# Patient Record
Sex: FEMALE | Race: WHITE | NOT HISPANIC OR LATINO | Employment: STUDENT | URBAN - NONMETROPOLITAN AREA
[De-identification: names, ages, dates, MRNs, and addresses within clinical notes are randomized per-mention and may not be internally consistent; named-entity substitution may affect disease eponyms.]

---

## 2017-01-13 ENCOUNTER — COMMUNICATION - GICH (OUTPATIENT)
Dept: FAMILY MEDICINE | Facility: OTHER | Age: 22
End: 2017-01-13

## 2017-01-13 DIAGNOSIS — Z30.49 ENCOUNTER FOR SURVEILLANCE OF OTHER CONTRACEPTIVES: ICD-10-CM

## 2017-08-21 ENCOUNTER — HISTORY (OUTPATIENT)
Dept: FAMILY MEDICINE | Facility: OTHER | Age: 22
End: 2017-08-21

## 2017-08-21 ENCOUNTER — OFFICE VISIT - GICH (OUTPATIENT)
Dept: FAMILY MEDICINE | Facility: OTHER | Age: 22
End: 2017-08-21

## 2017-08-21 DIAGNOSIS — Z01.419 ENCOUNTER FOR GYNECOLOGICAL EXAMINATION WITHOUT ABNORMAL FINDING: ICD-10-CM

## 2017-08-21 DIAGNOSIS — Z00.00 ENCOUNTER FOR GENERAL ADULT MEDICAL EXAMINATION WITHOUT ABNORMAL FINDINGS: ICD-10-CM

## 2017-08-21 DIAGNOSIS — Z11.3 ENCOUNTER FOR SCREENING FOR INFECTIONS WITH PREDOMINANTLY SEXUAL MODE OF TRANSMISSION: ICD-10-CM

## 2017-08-21 DIAGNOSIS — Z30.49 ENCOUNTER FOR SURVEILLANCE OF OTHER CONTRACEPTIVES: ICD-10-CM

## 2017-12-28 NOTE — PATIENT INSTRUCTIONS
"Patient Information     Patient Name MRN Matilde Graham [id number] Female 1995      Patient Instructions by Jasmine Chavez PA-C at 2017  8:15 AM     Author:  Jasmine Chavez PA-C Service:  (none) Author Type:  PHYS- Physician Assistant     Filed:  2017  8:42 AM Encounter Date:  2017 Status:  Signed     :  Jasmine Cahvez PA-C (PHYS- Physician Assistant)            Healthy Strategies  1. Eat at least 3 meals a day and never skip breakfast.  2. Eat more slowly.  3. Decrease portion size.  4. Provide structure by using meal replacement bars or shakes, and/or low calorie frozen meals.  5. For good nutrition incorporate fruit, vegetables, whole grains, lean protein, and low-fat dairy.  6. Remove trigger foods from your environment to avoid impulse eating.  7. Increase physical activity: get a pedometer and aim for 10,000 steps a day or 30-35 minutes of activity 5 days per week.  8. Weigh yourself daily or at least weekly.  9. Keep a record of what you eat and your activity.  10. Establish a support system such as a friend, group or program.  11. Read Nabeel Wilkerson's \"Eat to Live\". Remember it is important to have a minimum of 1200 calories a day, okay to use olive oil, 40 grams of fiber daily. No more than two servings ( the size of your palm) of red meat a week.     Please consider the following general health recommendations:    Eat a quality diet (generally, low in simple sugars, starches, cholesterol and saturated fat.)    Please get 1500 mg of calcium in divided doses with 1500 units vitamin D in your diet daily.     Stay physically active. Regular walking or other exercise is one of the best ways to minimize pain of arthritis; maintain independence and mobility; maintain bone strength; maintain conditioning of your heart. Find something you enjoy and a friend to do it with you.    Maintain ideal weight. Your Body mass index is Body mass index is 28.64 kg/(m^2).. " Generally a BMI of 20-25 is considered ideal. Overweight is defined as 25-30, Obese is 30-35 and markedly obese is greater than 35.    Apply sun block (SPF 25 or greater) on exposed skin anytime you are out in the sun to prevent skin cancer.     Wear a seatbelt whenever you are in a car.    Schedule a mammogram annually starting at the age of 40 years old unless recommended earlier by your primary care provider. Come for a general exam once yearly.    Colonoscopy (an exam of the colon) is recommended every 10 years after the age of 50 to screen for colon cancer. (More often if you are at increased risk.)    You should have a tetanus booster at least once every 10 years.    Obtain a flu shot every fall.    Check blood sugar annually. Cholesterol annually unless you have had a normal level when last checked within 5 years.     I recommend that you have a general physical exam every year. You should have a pap every 3-5 years between the ages of 30 and 70 depending on your test unless you have had previous abnormal pap smear, (in these cases the exams and PAP's should be done yearly). If you have had hysterectomy in the past, your future Pap plan may be different.

## 2017-12-28 NOTE — PROGRESS NOTES
Patient Information     Patient Name MRN Sex Matilde Barnes [id number] Female 1995      Progress Notes by Jasmine Chavez PA-C at 2017  8:15 AM     Author:  Jasmine Chavez PA-C Service:  (none) Author Type:  PHYS- Physician Assistant     Filed:  2017 12:48 PM Encounter Date:  2017 Status:  Signed     :  Jasmine Chavez PA-C (PHYS- Physician Assistant)            Nursing Notes:   Nuria Sadler  2017  8:30 AM  Signed  Patient is here today for a yearly physical  Nuria Sadler LPN 2017 8:14 AM      ANNUAL PHYSICAL - FEMALE    HPI: Matilde Mancilla is a 22 y.o. female who presents for a yearly exam.  Concerns include: none    Patient's last menstrual period was 2017.   Contraception: on OCP  Risk for STI?: no  Last pap: none  Any hx of abnormal paps:  no  FH of early CA?: paternal side has hx colon cancer  Cholesterol/DM concerns/screening: no  Tobacco?: no  Calcium intake: some  DEXA: na  Last mammo: na  Colonoscopy: na  Immunizations: last tetanus in 2017, hep a #1    Patient Active Problem List      Diagnosis Date Noted     Contraception management 2014     ACNE VULGARIS 06/15/2011     ASTHMA, EXERCISE INDUCED        Past Medical History:     Diagnosis  Date     Acne vulgaris      Exercise-induced asthma     improved with time        Past Surgical History:      Procedure  Laterality Date     NO PREVIOUS SURGERY         Social History     Social History        Marital status:  Single     Spouse name: N/A     Number of children:  N/A     Years of education:  N/A     Occupational History      Not on file.     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     One older brother.        Virgilio             Father.     Leah Mother.      Nikolay Brother.                 Family History       Problem   Relation Age of Onset     Asthma  Mother      Allergies  Mother      Good Health  Father      Does not go to doctor regularly       Other  Maternal Grandmother      aneurysm         Current Outpatient Prescriptions       Medication  Sig Dispense Refill     benzoyl peroxide 5% 5 % gel Apply  topically to affected area(s) every morning. 42.5 g 5     norgestimate-ethinyl estradiol (TRI-SPRINTEC) 0.18/0.215/0.25 mg-35 mcg (28) tablet Take 1 tablet by mouth once daily. 84 tablet 2     No current facility-administered medications for this visit.      Medications have been reviewed by me and are current to the best of my knowledge and ability.       REVIEW OF SYSTEMS:  Refer to HPI.    PHYSICAL EXAM:  /70  Pulse 64  Ht 1.64 m (5' 4.57\")  Wt 77 kg (169 lb 12.8 oz)  LMP 08/02/2017  BMI 28.64 kg/m2  CONSTITUTIONAL:  Alert, cooperative, NAD.  EYES: No scleral icterus.  PERRLA.  Conjunctiva clear.  ENT/MOUTH: External ears and nose normal.  TMs normal.  Moist mucous membranes. Oropharynx clear.    ENDO: No thyromegaly or thyroid nodules.  LYMPH:  No cervical or supraclavicular LA.    BREASTS: No skin abnormalities, no erythema.  No discrete masses.  No nipple discharge, no axillary, supra- or infraclavicular LA.   CARDIOVASCULAR: Regular, S1, S2.  No S3 or S4.  No murmur/gallop/rub.  No peripheral edema.  RESPIRATORY: CTA bilaterally, no wheezes, rhonchi or rales.  GI: Bowel sounds wnl.  Soft, nontender, nondistended.  No masses or HSM.  No rebound or guarding.  : Vulva: normal, no lesions or discharge  Urethral meatus: normal size and location, no lesions or discharge  Urethra: no tenderness or masses  Bladder: no fullness or tenderness  Vagina: normal appearance, no abnormal discharge, no lesions.  No evidence of cystocele or " rectocele.  Cervix: normal appearance, no lesions, no abnormal discharge, no cervical motion tenderness  Uterus: normal size and position, mobile, non-tender  Adnexa: no palpable masses bilaterally. No cervical motion tenderness.  Pap smear obtained: yes  MSKEL: Grossly normal ROM.  No clubbing.  INTEGUMENTARY:  Warm, dry.  No rash noted on exposed skin.  NEUROLOGIC: Facies symmetric.  Grossly normal movement and tone.  No tremor.  PSYCHIATRIC: Affect normal.  Speech fluent.      PHQ Depression Screen  Date of PHQ exam: 17  Over the last 2 weeks, how often have you been bothered by any of the following problems?  1. Little interest or pleasure in doing things: 0 - Not at all  2. Feeling down, depressed, or hopeless: 0 - Not at all     4. Feeling tired or having little energy: 0 - Not at all  5. Poor appetite or overeatin - Not at all  6. Feeling bad about yourself - or that you are a failure or have let yourself or your family down: 0 - Not at all  7. Trouble concentrating on things, such as reading the newspaper or watching television: 0 - Not at all  8. Moving or speaking so slowly that other people could have noticed. Or the opposite - being so fidgety or restless that you have been moving around a lot more than usual: 0 - Not at all  9. Thoughts that you would be better off dead, or of hurting yourself in some way: 0 - Not at all        Results for orders placed or performed in visit on 17      GC CHLAMYDIA TRACH PROBE      Result  Value Ref Range    CHLAMYDIA PCR NOT Detected NOT Detected, Invalid    N GONORRHOEAE PCR NOT Detected NOT Detected, Invalid   WET PREP GENITAL      Result  Value Ref Range    TRICHOMONAS               None Seen None Seen    YEAST                     None Seen None Seen    CLUE CELLS                None Seen None Seen       ASSESSMENT/PLAN:    ICD-10-CM    1. Physical exam, annual Z00.00    2. Encounter for surveillance of other contraceptive Z30.49 norgestimate-ethinyl  "estradiol (TRI-SPRINTEC) 0.18/0.215/0.25 mg-35 mcg (28) tablet   3. Pap test, as part of routine gynecological examination Z01.419 GYN THIN PREP PAP SCREEN IMAGED      GYN THIN PREP PAP SCREEN IMAGED   4. Screen for STD (sexually transmitted disease) Z11.3 GC CHLAMYDIA TRACH PROBE      WET PREP GENITAL      GC CHLAMYDIA TRACH PROBE      WET PREP GENITAL       Completed wet prep and STD testing. Negative.  Completed pap test. Results pending.  Refilled oral birth control pills. No new concerns at this time.  Return in one year for repeat physical.    Patient Instructions   Healthy Strategies  1. Eat at least 3 meals a day and never skip breakfast.  2. Eat more slowly.  3. Decrease portion size.  4. Provide structure by using meal replacement bars or shakes, and/or low calorie frozen meals.  5. For good nutrition incorporate fruit, vegetables, whole grains, lean protein, and low-fat dairy.  6. Remove trigger foods from your environment to avoid impulse eating.  7. Increase physical activity: get a pedometer and aim for 10,000 steps a day or 30-35 minutes of activity 5 days per week.  8. Weigh yourself daily or at least weekly.  9. Keep a record of what you eat and your activity.  10. Establish a support system such as a friend, group or program.  11. Read Nabeel Wilkerson's \"Eat to Live\". Remember it is important to have a minimum of 1200 calories a day, okay to use olive oil, 40 grams of fiber daily. No more than two servings ( the size of your palm) of red meat a week.     Please consider the following general health recommendations:    Eat a quality diet (generally, low in simple sugars, starches, cholesterol and saturated fat.)    Please get 1500 mg of calcium in divided doses with 1500 units vitamin D in your diet daily.     Stay physically active. Regular walking or other exercise is one of the best ways to minimize pain of arthritis; maintain independence and mobility; maintain bone strength; maintain conditioning " of your heart. Find something you enjoy and a friend to do it with you.    Maintain ideal weight. Your Body mass index is Body mass index is 28.64 kg/(m^2).. Generally a BMI of 20-25 is considered ideal. Overweight is defined as 25-30, Obese is 30-35 and markedly obese is greater than 35.    Apply sun block (SPF 25 or greater) on exposed skin anytime you are out in the sun to prevent skin cancer.     Wear a seatbelt whenever you are in a car.    Schedule a mammogram annually starting at the age of 40 years old unless recommended earlier by your primary care provider. Come for a general exam once yearly.    Colonoscopy (an exam of the colon) is recommended every 10 years after the age of 50 to screen for colon cancer. (More often if you are at increased risk.)    You should have a tetanus booster at least once every 10 years.    Obtain a flu shot every fall.    Check blood sugar annually. Cholesterol annually unless you have had a normal level when last checked within 5 years.     I recommend that you have a general physical exam every year. You should have a pap every 3-5 years between the ages of 30 and 70 depending on your test unless you have had previous abnormal pap smear, (in these cases the exams and PAP's should be done yearly). If you have had hysterectomy in the past, your future Pap plan may be different.               Relevant cancer screening discussed.    Counseled on healthy diet, Calcium and vitamin D intake, and exercise.    Jasmine Chavez PA-C ....................  8/21/2017   8:30 AM

## 2017-12-30 NOTE — NURSING NOTE
Patient Information     Patient Name MRN Sex Matilde Barnes [id number] Female 1995      Nursing Note by Nuria Sadler at 2017  8:15 AM     Author:  Nuria Sadler Service:  (none) Author Type:  (none)     Filed:  2017  8:30 AM Encounter Date:  2017 Status:  Signed     :  Nuria Sadler            Patient is here today for a yearly physical  Nuria Sadler LPN 2017 8:14 AM

## 2018-01-03 NOTE — TELEPHONE ENCOUNTER
Patient Information     Patient Name MRN Sex Matilde Barnes [id number] Female 1995      Telephone Encounter by Keke Rodriguez RN at 2017  4:28 PM     Author:  Keke Rodriguez RN Service:  (none) Author Type:  NURS- Registered Nurse     Filed:  2017  4:30 PM Encounter Date:  2017 Status:  Signed     :  Keke Rodriguez RN (NURS- Registered Nurse)            Hormones    Office visit in the past 12 months or per provider note.    Last visit with CARSON LIMON was on: 2016 in [facility]  Next visit with CARSON LIMON is on: No future appointment listed with this provider  Next visit with Family Practice is on: No future appointment listed in this department    Max refill for 12 months from last office visit or per provider note.     Switching to [facility]  Prescription refilled per RN Medication Refill Policy.................... Keke Rodrgiuez RN ....................  2017   4:28 PM

## 2018-01-27 VITALS
DIASTOLIC BLOOD PRESSURE: 70 MMHG | SYSTOLIC BLOOD PRESSURE: 118 MMHG | HEIGHT: 65 IN | WEIGHT: 169.8 LBS | BODY MASS INDEX: 28.29 KG/M2 | HEART RATE: 64 BPM

## 2018-03-09 ENCOUNTER — DOCUMENTATION ONLY (OUTPATIENT)
Dept: FAMILY MEDICINE | Facility: OTHER | Age: 23
End: 2018-03-09

## 2018-03-09 PROBLEM — J45.990 ASTHMA, EXERCISE INDUCED: Status: ACTIVE | Noted: 2018-03-09

## 2018-03-09 RX ORDER — NORGESTIMATE AND ETHINYL ESTRADIOL 7DAYSX3 28
1 KIT ORAL DAILY
COMMUNITY
Start: 2017-08-21 | End: 2018-10-02

## 2018-03-09 RX ORDER — BENZOYL PEROXIDE 5 G/100G
GEL TOPICAL
COMMUNITY
Start: 2016-08-16 | End: 2018-09-14

## 2018-09-14 ENCOUNTER — OFFICE VISIT (OUTPATIENT)
Dept: FAMILY MEDICINE | Facility: OTHER | Age: 23
End: 2018-09-14
Attending: PHYSICIAN ASSISTANT
Payer: COMMERCIAL

## 2018-09-14 VITALS
BODY MASS INDEX: 31.94 KG/M2 | WEIGHT: 189.4 LBS | DIASTOLIC BLOOD PRESSURE: 80 MMHG | SYSTOLIC BLOOD PRESSURE: 110 MMHG | HEART RATE: 62 BPM | RESPIRATION RATE: 18 BRPM

## 2018-09-14 DIAGNOSIS — Z11.1 SCREENING EXAMINATION FOR PULMONARY TUBERCULOSIS: ICD-10-CM

## 2018-09-14 DIAGNOSIS — Z11.3 SCREEN FOR STD (SEXUALLY TRANSMITTED DISEASE): ICD-10-CM

## 2018-09-14 DIAGNOSIS — N63.23 BREAST LUMP ON LEFT SIDE AT 5 O'CLOCK POSITION: ICD-10-CM

## 2018-09-14 DIAGNOSIS — Z00.00 ROUTINE HISTORY AND PHYSICAL EXAMINATION OF ADULT: Primary | ICD-10-CM

## 2018-09-14 DIAGNOSIS — Z30.09 BIRTH CONTROL COUNSELING: ICD-10-CM

## 2018-09-14 PROCEDURE — 86580 TB INTRADERMAL TEST: CPT | Performed by: PHYSICIAN ASSISTANT

## 2018-09-14 PROCEDURE — 99395 PREV VISIT EST AGE 18-39: CPT | Performed by: PHYSICIAN ASSISTANT

## 2018-09-14 PROCEDURE — 86704 HEP B CORE ANTIBODY TOTAL: CPT | Performed by: PHYSICIAN ASSISTANT

## 2018-09-14 PROCEDURE — 36415 COLL VENOUS BLD VENIPUNCTURE: CPT | Performed by: PHYSICIAN ASSISTANT

## 2018-09-14 PROCEDURE — 86706 HEP B SURFACE ANTIBODY: CPT | Performed by: PHYSICIAN ASSISTANT

## 2018-09-14 PROCEDURE — 87340 HEPATITIS B SURFACE AG IA: CPT | Performed by: PHYSICIAN ASSISTANT

## 2018-09-14 PROCEDURE — 86803 HEPATITIS C AB TEST: CPT | Performed by: PHYSICIAN ASSISTANT

## 2018-09-14 NOTE — MR AVS SNAPSHOT
"              After Visit Summary   9/14/2018    Matilde Mancilla    MRN: [id number]           Patient Information     Date Of Birth          1995        Visit Information        Provider Department      9/14/2018 2:45 PM Jasmine Chavez PA-C [facility]        Today's Diagnoses     Routine history and physical examination of adult    -  1    Birth control counseling        Screening examination for pulmonary tuberculosis        Screen for STD (sexually transmitted disease)          Care Instructions    Gave mantoux. Return in 48-72 hours to have the read completed.     Healthy Strategies  1. Eat at least 3 meals a day and never skip breakfast.  2. Eat more slowly.  3. Decrease portion size.  4. Provide structure by using meal replacement bars or shakes, and/or low calorie frozen meals.  5. For good nutrition incorporate fruit, vegetables, whole grains, lean protein, and low-fat dairy.  6. Remove trigger foods from yourenvironment to avoid impulse eating.  7. Increase physical activity: get a pedometer and aim for 10,000 steps a day or 30-35 minutes of activity 5 days per week.  8. Weigh yourself daily or at least weekly.  9. Keep a record of what you eat and your activity.  10. Establish a support system such as afriend, group or program.    11. Read Nabeel Wilkerson's \"Eat to Live\". Remember it is important to have a minimum of 1200 calories a day, okay to use olive oil, 40 grams of fiber daily. No more than two servings (the size of your palm) of red meat a week.     Please consider the following general health recommendations:    Schedule a mammogram annually starting at the age of 40 years old unless recommended earlier by your primary care provider. Come for a general exam once yearly.    Eat a quality diet (generally, low in simple sugars, starches, cholesterol and saturated fat.)    Please get 1500 mg of calcium in divided doses with 1500 units vitamin D in your diet daily. Take " supplements as needed to obtain full recommended amounts.     Stay physically active. Regular walking or other exercise is one of the best ways to minimize pain of arthritis; maintain independence and mobility; maintain bone strength; maintain conditioning of your heart. Find something you enjoy and a friend to do it with you.    Maintain ideal weight. Your Body mass index is Body mass index is 31.94 kg/(m^2).. Generally a BMI of 20-25 is considered ideal. Overweight is defined as 25-30, obese is 30-35 and markedly obese is greater than 35.    Apply sun block (SPF 25 or greater) on exposed skin anytime you are out in the sun to prevent skin cancer.     Wear a seatbelt whenever you are in a car.    Obtain a flu shot every fall.    You should have a tetanus booster at least once every 10 years.    Check blood sugar annually. Cholesterol annually unless you have had a normal level when last checked within 5 years.     I recommend that you have a general physical exam every year. You should have a pap test every 3 years between the ages of 21 and 30 and every 3-5 years between the ages of 30 and 65 depending on your test unless you have had previous abnormal pap smears, (in these cases the exams and PAP's should be done on a schedule as recommended by your primary care provider). If you have had hysterectomy in the past, your future Pap plan may be different.               Follow-ups after your visit        Additional Services     OB/GYN REFERRAL       Your provider has referred you to:  [facility]: [facility] [phone]   [email or web address]    IUD placement    Please be aware that coverage of these services is subject to the terms and limitations of your health insurance plan.  Call member services at your health plan with any benefit or coverage questions.      Please bring the following with you to your appointment:    (1) Any X-Rays, CTs or MRIs which have been performed.   "Contact the facility where they were done to arrange for  prior to your scheduled appointment.   (2) List of current medications   (3) This referral request   (4) Any documents/labs given to you for this referral                  Follow-up notes from your care team     Return if symptoms worsen or fail to improve.      Future tests that were ordered for you today     Open Future Orders        Priority Expected Expires Ordered    Hepatitis B Surface Antibody Routine  9/15/2019 9/14/2018    Hepatitis B surface antigen Routine  9/15/2019 9/14/2018    Hepatitis B Core Antibody Routine  9/14/2019 9/14/2018            Who to contact     If you have questions or need follow up information about today's clinic visit or your schedule please contact [facility] directly at [phone].  Normal or non-critical lab and imaging results will be communicated to you by TipCityhart, letter or phone within 4 business days after the clinic has received the results. If you do not hear from us within 7 days, please contact the clinic through TipCityhart or phone. If you have a critical or abnormal lab result, we will notify you by phone as soon as possible.  Submit refill requests through UniQure or call your pharmacy and they will forward the refill request to us. Please allow 3 business days for your refill to be completed.          Additional Information About Your Visit        UniQure Information     UniQure lets you send messages to your doctor, view your test results, renew your prescriptions, schedule appointments and more. To sign up, go to [email or web address] . Click on \"Log in\" on the left side of the screen, which will take you to the Welcome page. Then click on \"Sign up Now\" on the right side of the page.     You will be asked to enter the access code listed below, as well as some personal information. Please follow the directions to create your username and password.     Your access code is: " [email or web address]  Expires: 2018  3:28 PM     Your access code will  in 90 days. If you need help or a new code, please call your [facility] or [phone].        Care EveryWhere ID     This is your Care EveryWhere ID. This could be used by other organizations to access your [place] medical records  [id number]        Your Vitals Were     Pulse Respirations Last Period BMI (Body Mass Index)          62 18 2018 31.94 kg/m2         Blood Pressure from Last 3 Encounters:   18 110/80   17 118/70   16 110/70    Weight from Last 3 Encounters:   18 189 lb 6.4 oz (85.9 kg)   17 169 lb 12.8 oz (77 kg)   16 161 lb (73 kg)              We Performed the Following     GH IMM-  [facility]     Hepatitis C Screen Reflex to HCV RNA Quant and Genotype     OB/GYN REFERRAL          Today's Medication Changes          These changes are accurate as of 18  3:28 PM.  If you have any questions, ask your nurse or doctor.               Stop taking these medicines if you haven't already. Please contact your care team if you have questions.     benzoyl peroxide 5 % topical gel   Stopped by:  Jasmine Chavez PA-C                    Primary Care Provider Fax #    Physician No Ref-Primary [phone]       No address on file        Equal Access to Services     HANNAH LEMA : Hadshalini oquendoo Soerik, waaxda luqadaha, qaybta kaalarthur lee. So [facility] [phone].    ATENCIÓN: Si habla español, tiene a hickey disposición servicios gratuitos de asistencia lingüística. Llbismark al [phone].    We comply with applicable federal civil rights laws and [place] laws. We do not discriminate on the basis of race, color, national origin, age, disability, sex, sexual orientation, or gender identity.            Thank you!     Thank you for choosing [facility] [place]  for your care. Our goal is always to provide you with excellent  care. Hearing back from our patients is one way we can continue to improve our services. Please take a few minutes to complete the written survey that you may receive in the mail after your visit with us. Thank you!             Your Updated Medication List - Protect others around you: Learn how to safely use, store and throw away your medicines at www.disposemymeds.org.          This list is accurate as of 9/14/18  3:28 PM.  Always use your most recent med list.                   Brand Name Dispense Instructions for use Diagnosis    norgestim-eth estrad triphasic 0.18/0.215/0.25 MG-35 MCG per tablet    ORTHO TRI-CYCLEN, TRI-SPRINTEC     Take 1 tablet by mouth daily

## 2018-09-14 NOTE — NURSING NOTE
"Patient presents to clinic for school physical.  Anita Louise LPN ...... 9/14/2018 2:58 PM    Chief Complaint   Patient presents with     Physical       Initial /80 (BP Location: Right arm, Patient Position: Sitting, Cuff Size: Adult Regular)  Pulse 62  Resp 18  Wt 189 lb 6.4 oz (85.9 kg)  LMP 09/02/2018  BMI 31.94 kg/m2 Estimated body mass index is 31.94 kg/(m^2) as calculated from the following:    Height as of 8/21/17: 5' 4.57\" (1.64 m).    Weight as of this encounter: 189 lb 6.4 oz (85.9 kg).  Medication Reconciliation: complete    Vy Louise LPN    "

## 2018-09-14 NOTE — PATIENT INSTRUCTIONS
"Gave mena. Return in 48-72 hours to have the read completed.     Healthy Strategies  1. Eat at least 3 meals a day and never skip breakfast.  2. Eat more slowly.  3. Decrease portion size.  4. Provide structure by using meal replacement bars or shakes, and/or low calorie frozen meals.  5. For good nutrition incorporate fruit, vegetables, whole grains, lean protein, and low-fat dairy.  6. Remove trigger foods from yourenvironment to avoid impulse eating.  7. Increase physical activity: get a pedometer and aim for 10,000 steps a day or 30-35 minutes of activity 5 days per week.  8. Weigh yourself daily or at least weekly.  9. Keep a record of what you eat and your activity.  10. Establish a support system such as afriend, group or program.    11. Read Nabeel Wilkerson's \"Eat to Live\". Remember it is important to have a minimum of 1200 calories a day, okay to use olive oil, 40 grams of fiber daily. No more than two servings (the size of your palm) of red meat a week.     Please consider the following general health recommendations:    Schedule a mammogram annually starting at the age of 40 years old unless recommended earlier by your primary care provider. Come for a general exam once yearly.    Eat a quality diet (generally, low in simple sugars, starches, cholesterol and saturated fat.)    Please get 1500 mg of calcium in divided doses with 1500 units vitamin D in your diet daily. Take supplements as needed to obtain full recommended amounts.     Stay physically active. Regular walking or other exercise is one of the best ways to minimize pain of arthritis; maintain independence and mobility; maintain bone strength; maintain conditioning of your heart. Find something you enjoy and a friend to do it with you.    Maintain ideal weight. Your Body mass index is Body mass index is 31.94 kg/(m^2).. Generally a BMI of 20-25 is considered ideal. Overweight is defined as 25-30, obese is 30-35 and markedly obese is greater than " 35.    Apply sun block (SPF 25 or greater) on exposed skin anytime you are out in the sun to prevent skin cancer.     Wear a seatbelt whenever you are in a car.    Obtain a flu shot every fall.    You should have a tetanus booster at least once every 10 years.    Check blood sugar annually. Cholesterol annually unless you have had a normal level when last checked within 5 years.     I recommend that you have a general physical exam every year. You should have a pap test every 3 years between the ages of 21 and 30 and every 3-5 years between the ages of 30 and 65 depending on your test unless you have had previous abnormal pap smears, (in these cases the exams and PAP's should be done on a schedule as recommended by your primary care provider). If you have had hysterectomy in the past, your future Pap plan may be different.

## 2018-09-14 NOTE — PROGRESS NOTES
"Nursing Notes:   Vy Louise LPN  9/14/2018  3:03 PM  Signed  Patient presents to clinic for school physical.  Anita Dani SLATER ...... 9/14/2018 2:58 PM    Chief Complaint   Patient presents with     Physical       Initial /80 (BP Location: Right arm, Patient Position: Sitting, Cuff Size: Adult Regular)  Pulse 62  Resp 18  Wt 189 lb 6.4 oz (85.9 kg)  LMP 09/02/2018  BMI 31.94 kg/m2 Estimated body mass index is 31.94 kg/(m^2) as calculated from the following:    Height as of 8/21/17: 5' 4.57\" (1.64 m).    Weight as of this encounter: 189 lb 6.4 oz (85.9 kg).  Medication Reconciliation: complete    Vy Louise LPN      ANNUAL PHYSICAL - FEMALE    HPI: Matilde Mancilla who presents for a yearly exam.  Concerns include: going to med school in the [employer]. Needs to have physical paperwork completed for college.   Want an IUD.  No problem with the birth control pills.  Would like an IUD so she does not have to worry about medication.    She has noticed a possible lump in her left breast previously.  Nontender.  No nipple discharge.  Has bilateral nipple rings.  No personal or family history of breast cancer concerns.    Needs a tuberculosis screening and hepatitis screening for school.  Negative tuberculosis screening in the past.  No acute risk factors.  No chest pain, palpitations, problems breathing, coughing up blood.    Patient's last menstrual period was 09/02/2018.   Contraception: OCPs  Risk for STI?: no  Last pap: 8/21/2017 - nl  Any hx of abnormal paps:  no  FH of early CA?: see family hx  Cholesterol/DM concerns/screening: no  Tobacco?: no  Calcium intake: yes  DEXA: na  Last mammo: na  Colonoscopy: na  Immunizations: UTD    Patient Active Problem List    Diagnosis Date Noted     Asthma, exercise induced 03/09/2018     Priority: Medium     Contraception management 02/04/2014     Priority: Medium     Other acne 06/15/2011     Priority: Medium       Past Medical " History:   Diagnosis Date     Acne vulgaris     No Comments Provided     Exercise-induced bronchospasm     improved with time       Past Surgical History:   Procedure Laterality Date     NO HISTORY OF SURGERY         Family History   Problem Relation Age of Onset     Asthma Mother      Asthma     Allergy (Severe) Mother      Allergies     Hyperparathyroidism Mother      Family History Negative Father      Good Health,Does not go to doctor regularly     Other - See Comments Maternal Grandmother      aneurysm       Social History     Social History     Marital status: Single     Spouse name: N/A     Number of children: N/A     Years of education: N/A     Occupational History     Not on file.     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   One older brother.    Virgilio             Father.   Leah Mother.    Nikolay Brother.       Current Outpatient Prescriptions   Medication Sig Dispense Refill     norgestim-eth estrad triphasic (ORTHO TRI-CYCLEN, TRI-SPRINTEC) 0.18/0.215/0.25 MG-35 MCG per tablet Take 1 tablet by mouth daily         No Known Allergies    REVIEW OF SYSTEMS:  Refer to HPI.    PHYSICAL EXAM:  /80 (BP Location: Right arm, Patient Position: Sitting, Cuff Size: Adult Regular)  Pulse 62  Resp 18  Wt 189 lb 6.4 oz (85.9 kg)  LMP 09/02/2018  BMI 31.94 kg/m2  CONSTITUTIONAL:  Alert,cooperative, NAD.  EYES: No scleral icterus.  PERRLA.  Conjunctiva clear.  ENT/MOUTH: External ears and nose normal.  TMs normal.  Moist mucous membranes. Oropharynx clear.    ENDO: No thyromegaly or thyroidnodules.  LYMPH:  No cervical or supraclavicular LA.    BREASTS: No skin abnormalities, no  erythema.   No nipple discharge, no axillary, supra- or infraclavicular LA. approximate 1 x 1 cm mobile lump in the left breast at 5:00 approximately 4 cm away from the nipple.   CARDIOVASCULAR: Regular,S1, S2.  No S3 or S4.  No murmur/gallop/rub.  No peripheral edema.  RESPIRATORY: CTA bilaterally, no wheezes, rhonchi or rales.  GI: Bowel sounds wnl.  Soft, nontender, nondistended.  No masses or HSM.  No rebound or guarding.  : declined exam  Pap smear obtained: no  MSKEL: Grossly normal ROM.  No clubbing.  INTEGUMENTARY:  Warm, dry.  No rash noted on exposed skin.  NEUROLOGIC: Facies symmetric.  Grossly normal movement and tone.  No tremor.  PSYCHIATRIC: Affect normal.  Speech fluent.      PHQ Depression Screen  PHQ-9 SCORE 5/8/2015   Total Score 3         ASSESSMENT AND PLAN:    1. Routine history and physical examination of adult    2. Birth control counseling    3. Screening examination for pulmonary tuberculosis    4. Screen for STD (sexually transmitted disease)    5. Breast lump on left side at 5 o'clock position        Gave mantoux. Return in 48-72 hours to have the read completed.     Completed hepatitis B and C screening for STD screening.    Breast lump: Ordered left breast ultrasound to rule out concerns.    Referred to OB/GYN for IUD placement.    Return in 1 year for repeat physical.    Patient Instructions   Gave mantoux. Return in 48-72 hours to have the read completed.     Healthy Strategies  1. Eat at least 3 meals a day and never skip breakfast.  2. Eat more slowly.  3. Decrease portion size.  4. Provide structure by using meal replacement bars or shakes, and/or low calorie frozen meals.  5. For good nutrition incorporate fruit, vegetables, whole grains, lean protein, and low-fat dairy.  6. Remove trigger foods from yourenvironment to avoid impulse eating.  7. Increase physical activity: get a pedometer and aim for 10,000 steps a day or 30-35 minutes of activity 5 days per week.  8. Weigh  "yourself daily or at least weekly.  9. Keep a record of what you eat and your activity.  10. Establish a support system such as afriend, group or program.    11. Read Nabeel Wilkerson's \"Eat to Live\". Remember it is important to have a minimum of 1200 calories a day, okay to use olive oil, 40 grams of fiber daily. No more than two servings (the size of your palm) of red meat a week.     Please consider the following general health recommendations:    Schedule a mammogram annually starting at the age of 40 years old unless recommended earlier by your primary care provider. Come for a general exam once yearly.    Eat a quality diet (generally, low in simple sugars, starches, cholesterol and saturated fat.)    Please get 1500 mg of calcium in divided doses with 1500 units vitamin D in your diet daily. Take supplements as needed to obtain full recommended amounts.     Stay physically active. Regular walking or other exercise is one of the best ways to minimize pain of arthritis; maintain independence and mobility; maintain bone strength; maintain conditioning of your heart. Find something you enjoy and a friend to do it with you.    Maintain ideal weight. Your Body mass index is Body mass index is 31.94 kg/(m^2).. Generally a BMI of 20-25 is considered ideal. Overweight is defined as 25-30, obese is 30-35 and markedly obese is greater than 35.    Apply sun block (SPF 25 or greater) on exposed skin anytime you are out in the sun to prevent skin cancer.     Wear a seatbelt whenever you are in a car.    Obtain a flu shot every fall.    You should have a tetanus booster at least once every 10 years.    Check blood sugar annually. Cholesterol annually unless you have had a normal level when last checked within 5 years.     I recommend that you have a general physical exam every year. You should have a pap test every 3 years between the ages of 21 and 30 and every 3-5 years between the ages of 30 and 65 depending on your test " unless you have had previous abnormal pap smears, (in these cases the exams and PAP's should be done on a schedule as recommended by your primary care provider). If you have had hysterectomy in the past, your future Pap plan may be different.           Relevant cancer screening discussed.    Counseled on healthy diet, Calcium and vitamin D intake, and exercise.    Jasmine Chavez PA-C..................9/14/2018 3:10 PM

## 2018-09-16 ENCOUNTER — ALLIED HEALTH/NURSE VISIT (OUTPATIENT)
Dept: FAMILY MEDICINE | Facility: OTHER | Age: 23
End: 2018-09-16
Payer: COMMERCIAL

## 2018-09-16 DIAGNOSIS — Z00.00 ROUTINE HISTORY AND PHYSICAL EXAMINATION OF ADULT: Primary | ICD-10-CM

## 2018-09-16 PROCEDURE — 99207 ZZC NO CHARGE NURSE ONLY: CPT

## 2018-09-16 PROCEDURE — 96372 THER/PROPH/DIAG INJ SC/IM: CPT

## 2018-09-16 NOTE — NURSING NOTE
Patient presents to the clinic for reading of Mantoux. Read is 0.0 induration; charted under original immunization documention.   Viola Sandhu LPN............. September 16, 2018 6:28 PM

## 2018-09-16 NOTE — MR AVS SNAPSHOT
After Visit Summary   9/16/2018    Matilde Mancilla    MRN: [id number]           Patient Information     Date Of Birth          1995        Visit Information        Provider Department      9/16/2018 6:15 PM [facility] NURSE [facility]        Today's Diagnoses     Routine history and physical examination of adult    -  1       Follow-ups after your visit        Your next 10 appointments already scheduled     Oct 02, 2018 12:45 PM CDT   PROCEDURE with Doug Hanson MD   [facility] ([facility])    [address]  [place] [address] [phone]              Who to contact     If you have questions or need follow up information about today's clinic visit or your schedule please contact [facility] directly at [phone].  Normal or non-critical lab and imaging results will be communicated to you by [employer], letter or phone within 4 business days after the clinic has received the results. If you do not hear from us within 7 days, please contact the clinic through [employer] or phone. If you have a critical or abnormal lab result, we will notify you by phone as soon as possible.  Submit refill requests through [employer] or call your pharmacy and they will forward the refill request to us. Please allow 3 business days for your refill to be completed.          Additional Information About Your Visit        [employer] Information     [employer] gives you secure access to your electronic health record. If you see a primary care provider, you can also send messages to your care team and make appointments. If you have questions, please call your primary care clinic.  If you do not have a primary care provider, please call [phone] and they will assist you.        Care EveryWhere ID     This is your Care EveryWhere ID. This could be used by other organizations to access your [facility]  records  [id number]        Your Vitals Were     Last Period                   09/02/2018            Blood Pressure from Last 3 Encounters:   09/14/18 110/80   08/21/17 118/70   08/16/16 110/70    Weight from Last 3 Encounters:   09/14/18 189 lb 6.4 oz (85.9 kg)   08/21/17 169 lb 12.8 oz (77 kg)   08/16/16 161 lb (73 kg)              Today, you had the following     No orders found for display       Primary Care Provider Fax #    Physician No Ref-Primary [phone]       No address on file        Equal Access to Services     [facility]: Hadii anibal Byrd, waaxda chrissie, sylvia kaalmahunter urbano, arthur escalante . So [facility] [phone].    ATENCIÓN: Si habla español, tiene a hickey disposición servicios gratuitos de asistencia lingüística. Llame al [phone].    We comply with applicable federal civil rights laws and [place] laws. We do not discriminate on the basis of race, color, national origin, age, disability, sex, sexual orientation, or gender identity.            Thank you!     Thank you for choosing [facility]  for your care. Our goal is always to provide you with excellent care. Hearing back from our patients is one way we can continue to improve our services. Please take a few minutes to complete the written survey that you may receive in the mail after your visit with us. Thank you!             Your Updated Medication List - Protect others around you: Learn how to safely use, store and throw away your medicines at www.disposemymeds.org.          This list is accurate as of 9/16/18  6:32 PM.  Always use your most recent med list.                   Brand Name Dispense Instructions for use Diagnosis    norgestim-eth estrad triphasic 0.18/0.215/0.25 MG-35 MCG per tablet    ORTHO TRI-CYCLEN, TRI-SPRINTEC     Take 1 tablet by mouth daily

## 2018-09-18 LAB
HBV CORE AB SERPL QL IA: NONREACTIVE
HBV SURFACE AB SERPL IA-ACNC: 170.18 M[IU]/ML
HBV SURFACE AG SERPL QL IA: NONREACTIVE
HCV AB SERPL QL IA: NONREACTIVE

## 2018-09-20 ENCOUNTER — HOSPITAL ENCOUNTER (OUTPATIENT)
Dept: ULTRASOUND IMAGING | Facility: OTHER | Age: 23
End: 2018-09-20
Attending: PHYSICIAN ASSISTANT
Payer: COMMERCIAL

## 2018-09-20 DIAGNOSIS — N63.23 BREAST LUMP ON LEFT SIDE AT 5 O'CLOCK POSITION: ICD-10-CM

## 2018-09-20 PROCEDURE — 76641 ULTRASOUND BREAST COMPLETE: CPT | Mod: LT

## 2018-09-20 PROCEDURE — 76642 ULTRASOUND BREAST LIMITED: CPT | Mod: LT

## 2018-09-26 ENCOUNTER — HOSPITAL ENCOUNTER (OUTPATIENT)
Dept: ULTRASOUND IMAGING | Facility: OTHER | Age: 23
Discharge: HOME | End: 2018-09-26
Attending: RADIOLOGY | Admitting: RADIOLOGY
Payer: COMMERCIAL

## 2018-09-26 ENCOUNTER — HOSPITAL ENCOUNTER (OUTPATIENT)
Dept: MAMMOGRAPHY | Facility: OTHER | Age: 23
End: 2018-09-26
Attending: RADIOLOGY
Payer: COMMERCIAL

## 2018-09-26 VITALS
DIASTOLIC BLOOD PRESSURE: 77 MMHG | TEMPERATURE: 98.1 F | OXYGEN SATURATION: 99 % | RESPIRATION RATE: 16 BRPM | SYSTOLIC BLOOD PRESSURE: 128 MMHG | HEART RATE: 80 BPM

## 2018-09-26 DIAGNOSIS — N63.23 BREAST LUMP ON LEFT SIDE AT 5 O'CLOCK POSITION: ICD-10-CM

## 2018-09-26 PROCEDURE — 19083 BX BREAST 1ST LESION US IMAG: CPT | Mod: LT

## 2018-09-26 PROCEDURE — 40000986 MA POST PROCEDURE LEFT

## 2018-09-26 PROCEDURE — 88305 TISSUE EXAM BY PATHOLOGIST: CPT

## 2018-09-26 PROCEDURE — 25000125 ZZHC RX 250: Performed by: RADIOLOGY

## 2018-09-26 RX ORDER — LIDOCAINE HYDROCHLORIDE AND EPINEPHRINE 10; 10 MG/ML; UG/ML
10 INJECTION, SOLUTION INFILTRATION; PERINEURAL
Status: COMPLETED | OUTPATIENT
Start: 2018-09-26 | End: 2018-09-26

## 2018-09-26 RX ADMIN — LIDOCAINE HYDROCHLORIDE 10 ML: 10 INJECTION, SOLUTION INFILTRATION; PERINEURAL at 13:41

## 2018-09-26 RX ADMIN — LIDOCAINE HYDROCHLORIDE,EPINEPHRINE BITARTRATE 10 ML: 10; .01 INJECTION, SOLUTION INFILTRATION; PERINEURAL at 13:41

## 2018-09-26 NOTE — IP AVS SNAPSHOT
[facility]    [address]    [place] [address]    Phone:  [phone]    Fax:  [phone]                                       After Visit Summary   9/26/2018    Matilde Mancilla    MRN: [id number]           After Visit Summary Signature Page     I have received my discharge instructions, and my questions have been answered. I have discussed any challenges I see with this plan with the nurse or doctor.    ..........................................................................................................................................  Patient/Patient Representative Signature      ..........................................................................................................................................  Patient Representative Print Name and Relationship to Patient    ..................................................               ................................................  Date                                   Time    ..........................................................................................................................................  Reviewed by Signature/Title    ...................................................              ..............................................  Date                                               Time          22EPIC Rev 08/18

## 2018-09-26 NOTE — IP AVS SNAPSHOT
MRN:[id number]                      After Visit Summary   9/26/2018    Matilde Mancilla    MRN: [id number]           Visit Information        Provider Department      9/26/2018  1:15 PM RAD2;  IMAGING NURSE; [facility]           Review of your medicines      UNREVIEWED medicines. Ask your doctor about these medicines        Dose / Directions    norgestim-eth estrad triphasic 0.18/0.215/0.25 MG-35 MCG per tablet   Commonly known as:  ORTHO TRI-CYCLEN, TRI-SPRINTEC        Dose:  1 tablet   Take 1 tablet by mouth daily   Refills:  0                Protect others around you: Learn how to safely use, store and throw away your medicines at www.disposemymeds.org.         Follow-ups after your visit        Your next 10 appointments already scheduled     Sep 26, 2018  3:30 PM CDT   MA POST PROCEDURE LEFT with [facility]   [facility] ([facility]    [address]  [facility] [id number] [phone]           How do I prepare for my exam? (Food and drink instructions) No Food and Drink Restrictions.  How do I prepare for my exam? (Other instructions) Do not use any powder, lotion or deodorant under your arms or on your breast. If you do, we will ask you to remove it before your exam.  What should I wear: Wear comfortable, two-piece clothing.  How long does the exam take: Most scans will take 15 minutes.  What should I bring: Bring any previous mammograms from other facilities or have them mailed to the breast center.  Do I need a :  No  is needed.  What do I need to tell my doctor: If you have any allergies, tell your care team.  What should I do after the exam: No restrictions, You may resume normal activities.  What is this test: This test is an x-ray of the breast to look for breast disease. The breast is pressed between two plates to flatten and spread the tissue. An X-ray is taken of the breast from  "different angles.  Who should I call with questions: If you have any questions, please call the Imaging Department where you will have your exam. Directions, parking instructions, and other information is available on our website, [email or web address].  Other information about my exam Three-dimensional (3D) mammograms are available at [place] locations in [facility], [place], [place], [facility], [place], and [place]. [facility] locations include [place] and the [facility] in [place].  Benefits of 3D mammograms include: * Improved rate of cancer detection * Decreases your chance of having to go back for more tests, which means fewer: * \"False-positive\" results (This means that there is an abnormal area but it isn't cancer.) * Invasive testing procedures, such as a biopsy or surgery * Can provide clearer images of the breast if you have dense breast tissue.  *3D mammography is an optional exam that anyone can have with a 2D mammogram. It doesn't replace or take the place of a 2D mammogram. 2D mammograms remain an effective screening test for all women.  Not all insurance companies cover the cost of a 3D mammogram. Check with your insurance.            Oct 02, 2018 12:45 PM CDT   PROCEDURE with Doug Hanson MD   [facility] ([facility]    [address]  [place] [address]   [phone]            Oct 02, 2018  2:50 PM CDT   New Visit with Corrie Edwards MD   [facility] ([facility])    [address]  [place] [address]   [phone]               Care Instructions        Further instructions from your care team       NEEDLE BIOPSY BREAST    Activity: Rest the remainder of the day. You may resume normal activity after the next day. Avoid any vigorous/strenuous physical activity for 24 hours.    Comfort: If you have discomfort or tenderness at the site you may take " "your usual or recommended pain medication. Do not take aspirin the day of the procedure or for 48 hours following the biopsy.    Diet: You may resume your usual diet.    Care of site: Leave ice pack in place for 4 hours, or until it is no longer cold. The ice pack is reusable and may be refrozen.  Keep your bra and the dressing on for 24 hours. Then you may remove the bandage and shower. If there are steri-strips you may remove them in 3 to 5 days.  You may have some discomfort and a small amount of bruising where the biopsy was performed. This is normal. For several days or even a couple of weeks, you may have tenderness or \"twinges\" and a tiny bump where the needle went into the skin. This can be bothersome, but is not abnormal. You can use warm moist washcloths, as this may help. Do Not Use A Heating Pad.    RETURN TO THE EMERGENCY ROOM FOR:   Shortness of breath   Rapid heart rate   If pain becomes worse    Call Your Doctor For:    A fever over 101 degrees   Increased redness, increased swelling, and/or persistent drainage/discomfort  around the site    Other: At the end of your breast biopsy, a tiny titanium clip will be inserted through the biopsy needle and placed at the biopsy site within your breast. The marker provides a landmark of the biopsy for further mammograms or surgical procedures. This marker is MRI compatible and poses no known health risks.      If results are benign imaging may still recommended in 6 months by the radiologist after they review your pathology report. Our Radiology Department will call you to schedule this appointment.    For questions, problems, or concerns contact the Radiology Department at [phone].           Additional Information About Your Visit        Mobile [employer] Information     [employer] gives you secure access to your electronic health record. If you see a primary care provider, you can also send messages to your care team and make appointments. If you have questions, " please call your primary care clinic.  If you do not have a primary care provider, please call [phone] and they will assist you.        Care EveryWhere ID     This is your Care EveryWhere ID. This could be used by other organizations to access your [place] medical records  [id number]        Your Vitals Were     Blood Pressure Pulse Temperature Last Period Pulse Oximetry       137/94 (BP Location: Left arm) 103 98.1  F (36.7  C) (Tympanic) 09/02/2018 99%        Primary Care Provider Fax #    Physician No Ref-Primary [phone]      Equal Access to Services     [facility]: Hadii aad ku hadasho Soomaali, waaxda luqadaha, qaybta kaalmada yolie, arthur escalante . So [facility] [phone].    ATENCIÓN: Si habla español, tiene a hickey disposición servicios gratuitos de asistencia lingüística. Llame al [phone].    We comply with applicable federal civil rights laws and [place] laws. We do not discriminate on the basis of race, color, national origin, age, disability, sex, sexual orientation, or gender identity.            Thank you!     Thank you for choosing [facility] for your care. Our goal is always to provide you with excellent care. Hearing back from our patients is one way we can continue to improve our services. Please take a few minutes to complete the written survey that you may receive in the mail after you visit with us. Thank you!             Medication List: This is a list of all your medications and when to take them. Check marks below indicate your daily home schedule. Keep this list as a reference.      Medications           Morning Afternoon Evening Bedtime As Needed    norgestim-eth estrad triphasic 0.18/0.215/0.25 MG-35 MCG per tablet   Commonly known as:  ORTHO TRI-CYCLEN, TRI-SPRINTEC   Take 1 tablet by mouth daily

## 2018-09-26 NOTE — DISCHARGE INSTRUCTIONS
"NEEDLE BIOPSY BREAST    Activity: Rest the remainder of the day. You may resume normal activity after the next day. Avoid any vigorous/strenuous physical activity for 24 hours.    Comfort: If you have discomfort or tenderness at the site you may take your usual or recommended pain medication. Do not take aspirin the day of the procedure or for 48 hours following the biopsy.    Diet: You may resume your usual diet.    Care of site: Leave ice pack in place for 4 hours, or until it is no longer cold. The ice pack is reusable and may be refrozen.  Keep your bra and the dressing on for 24 hours. Then you may remove the bandage and shower. If there are steri-strips you may remove them in 3 to 5 days.  You may have some discomfort and a small amount of bruising where the biopsy was performed. This is normal. For several days or even a couple of weeks, you may have tenderness or \"twinges\" and a tiny bump where the needle went into the skin. This can be bothersome, but is not abnormal. You can use warm moist washcloths, as this may help. Do Not Use A Heating Pad.    RETURN TO THE EMERGENCY ROOM FOR:   Shortness of breath   Rapid heart rate   If pain becomes worse    Call Your Doctor For:    A fever over 101 degrees   Increased redness, increased swelling, and/or persistent drainage/discomfort  around the site    Other: At the end of your breast biopsy, a tiny titanium clip will be inserted through the biopsy needle and placed at the biopsy site within your breast. The marker provides a landmark of the biopsy for further mammograms or surgical procedures. This marker is MRI compatible and poses no known health risks.      If results are benign imaging may still recommended in 6 months by the radiologist after they review your pathology report. Our Radiology Department will call you to schedule this appointment.    For questions, problems, or concerns contact the Radiology Department at [phone].        "

## 2018-09-26 NOTE — PROGRESS NOTES
Pressure held on biopsy site for 5 minutes. Sterile 2x2 placed over insertion site and covered with a sterile tegaderm.    Patient brought to mamms for post clip mammogram:  yes    Sports bra and small ice pad in place over dressing.

## 2018-10-02 ENCOUNTER — OFFICE VISIT (OUTPATIENT)
Dept: OBGYN | Facility: OTHER | Age: 23
End: 2018-10-02
Attending: PHYSICIAN ASSISTANT
Payer: COMMERCIAL

## 2018-10-02 ENCOUNTER — OFFICE VISIT (OUTPATIENT)
Dept: SURGERY | Facility: OTHER | Age: 23
End: 2018-10-02
Attending: SURGERY
Payer: COMMERCIAL

## 2018-10-02 VITALS
WEIGHT: 189 LBS | SYSTOLIC BLOOD PRESSURE: 110 MMHG | BODY MASS INDEX: 32.27 KG/M2 | HEART RATE: 72 BPM | HEIGHT: 64 IN | DIASTOLIC BLOOD PRESSURE: 80 MMHG

## 2018-10-02 VITALS
WEIGHT: 186 LBS | SYSTOLIC BLOOD PRESSURE: 110 MMHG | HEART RATE: 72 BPM | DIASTOLIC BLOOD PRESSURE: 80 MMHG | BODY MASS INDEX: 31.37 KG/M2

## 2018-10-02 DIAGNOSIS — Z01.818 PRE-PROCEDURAL EXAMINATION: Primary | ICD-10-CM

## 2018-10-02 DIAGNOSIS — N63.23 BREAST LUMP ON LEFT SIDE AT 5 O'CLOCK POSITION: Primary | ICD-10-CM

## 2018-10-02 DIAGNOSIS — D24.2 FIBROADENOMA, LEFT: ICD-10-CM

## 2018-10-02 DIAGNOSIS — Z30.430 ENCOUNTER FOR IUD INSERTION: ICD-10-CM

## 2018-10-02 LAB
HCG UR QL: ABNORMAL
HCG UR QL: NEGATIVE

## 2018-10-02 PROCEDURE — 58300 INSERT INTRAUTERINE DEVICE: CPT | Performed by: OBSTETRICS & GYNECOLOGY

## 2018-10-02 PROCEDURE — 99243 OFF/OP CNSLTJ NEW/EST LOW 30: CPT | Performed by: SURGERY

## 2018-10-02 PROCEDURE — 25000128 H RX IP 250 OP 636: Performed by: OBSTETRICS & GYNECOLOGY

## 2018-10-02 PROCEDURE — 81025 URINE PREGNANCY TEST: CPT | Performed by: OBSTETRICS & GYNECOLOGY

## 2018-10-02 RX ADMIN — LEVONORGESTREL 19.5 MG: 19.5 INTRAUTERINE DEVICE INTRAUTERINE at 14:01

## 2018-10-02 ASSESSMENT — PAIN SCALES - GENERAL
PAINLEVEL: NO PAIN (0)
PAINLEVEL: NO PAIN (0)

## 2018-10-02 NOTE — PROGRESS NOTES
OFFICECONSULTATION NOTE  Patient Name: Matilde Mancilla  Address: [address] [place] [id number]  Age:23 year old  Sex: female     Primary Care Physician: No Ref-Primary, Physician    Gavin requested to see this patient in consultation by SANDRA Ardon for evaluation of left breast nodule. A copy of this note will be sent to Nandini.    HPI:   The patient is 23 year old female with a nodule noted in the left breast on recent physical exam. The patient hasn't noted any skin, nipple or breast changes. No previous breast cancer. No previous breast biopsy. No family history of breast cancer. The patient had biopsy performed that showed fibroadenoma.      CONSULTATION ASSESSMENT AND PLAN/RECOMMENDATIONS: Fibroadenoma left breast  I discussed with the patient the pathophysiology of breast nodules and breast disease. We specifically discussed that most abnormalities seen on US are not breast cancer. I explained that fibroadenomas are not a precancerous condition and that they don't turn into breast cancer. I recommended a 6 month left breast mammogram to follow up breast changes after the recent biopsy and to rule out rapid changes in the fibroadenoma. The patient expressed understanding and denies further questions. The patient will call with questions or concerns.     REVIEW OF SYSTEMS  GENERAL: No fevers or chills. Denies fatigue, recent weight loss.  HEENT: No sinus drainage. No changes with vision or hearing. No difficulty swallowing.   LYMPHATICS:  No swollen nodes in axilla, neck or groin.  CARDIOVASCULAR: Denies chest pain, palpitations and dyspnea on exertion.  PULMONARY: No shortness of breath or cough. No increase in sputum production.  GI:Denies melena, bright red blood in stools. No hematemesis. No constipation or diarrhea.  : No dysuria or hematuria.  SKIN: No recent rashes or ulcers.   HEMATOLOGY:  No history of easy bruising or bleeding.  ENDOCRINE:  No history of diabetes or thyroid  "problems.  NEUROLOGY:  No history of seizures or headaches. No motor or sensory changes.  BREAST:  Denies breast pain or changes.    PAST MEDICAL HISTORY  Past Medical History:   Diagnosis Date     Acne vulgaris     No Comments Provided     Exercise-induced bronchospasm     improved with time     PAST SURGICAL HISTORY  Past Surgical History:   Procedure Laterality Date     NO HISTORY OF SURGERY       CURRENT MEDS  No current outpatient prescriptions on file.     Current Facility-Administered Medications   Medication     levonorgestrel (KYLEENA) 19.5 MG IUD 19.5 mg     ALLERGIES/SENSITIVITIES  No Known Allergies  FAMILY HISTORY  Family History   Problem Relation Age of Onset     Asthma Mother      Asthma     Allergy (Severe) Mother      Allergies     Hyperparathyroidism Mother      Family History Negative Father      Good Health,Does not go to doctor regularly     Other - See Comments Maternal Grandmother      aneurysm     SOCIAL HISTORY  Social History     Social History     Marital status: Single     Spouse name: N/A     Number of children: N/A     Years of education: N/A     Social History Main Topics     Smoking status: Never Smoker     Smokeless tobacco: Never Used     Alcohol use No      Comment: extremely rare     Drug use: No     Sexual activity: Yes      Comment: Birth control method: currently not     Other Topics Concern     None     Social History Narrative    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   One older brother.    Virgilio             Father.   Leah Mother.    Nikolay Brother.     The above history was reviewed today, 10/2/2018    PHYSICAL EXAM  Vitals: /80  Pulse 72  Ht 5' 4\" (1.626 m)  Wt 189 lb (85.7 kg)  LMP 09/02/2018  BMI 32.44 kg/m2  BMI= Body mass index is 32.44 kg/(m^2).   GENERAL: Healthy appearing patient in no acute distress. Pleasant and cooperative with exam and interview.   HEENT: Head-normocephalic. Eyes-no scleral icterus, pupils equal, " round, and reactive to light. Nose-no nasaldrainage. No lesions. Mouth-oral mucosa pink and moist, no lesions.  NECK: Supple. No thyroid nodules. Trachea midline.  LYMPHATICS:  No cervical, axillary or supraclavicular adenopathy.  CV: Regular rate andrhythm, no murmurs. No peripheral edema.  LUNGS:  No respiratory distress. Clear bilaterally to auscultation.  ABDOMEN: Non distended. Bowel sounds active. Soft, non-tender, no hepatosplenomegaly or hernias. Noperitoneal signs.  SKIN: Pink, warm and dry. No jaundice. No rash.  NEURO:  Cranial nerves II-XII grossly intact. Alert and oriented.  PSYCH: Appropriate mood and affect.  BREAST: Breasts were examined in the seated and supine position. No mass noted bilaterally. No nipple changes or discharge bilaterally. Post biopsy changes noted left breast. Resolving ecchymosis with no sign of infection. Appropriate tenderness noted.    IMAGING/LAB  I personally reviewed patient's recent US and biopsy images and report and pathology report.    Corrie Edwards

## 2018-10-02 NOTE — NURSING NOTE
"Chief Complaint   Patient presents with     Hospital F/U     Bx results       Initial LMP 09/02/2018 Estimated body mass index is 31.37 kg/(m^2) as calculated from the following:    Height as of 8/21/17: 5' 4.57\" (1.64 m).    Weight as of an earlier encounter on 10/2/18: 186 lb (84.4 kg).  Medication Reconciliation: complete    Lisette Haines LPN  "

## 2018-10-02 NOTE — MR AVS SNAPSHOT
After Visit Summary   10/2/2018    Matilde Mancilla    MRN: [id number]           Patient Information     Date Of Birth          1995        Visit Information        Provider Department      10/2/2018 2:50 PM Corrie Edwards MD [facility]        Care Instructions    Call for concerns.           Follow-ups after your visit        Follow-up notes from your care team     Return in about 6 months (around 4/2/2019) for us left breast..      Your next 10 appointments already scheduled     Oct 02, 2018  2:50 PM CDT   New Visit with Corrie Edwards MD   [facility] ([facility]    [address]  [place] [address] [phone]              Who to contact     If you have questions or need follow up information about today's clinic visit or your schedule please contact [facility] directly at [phone].  Normal or non-critical lab and imaging results will be communicated to you by [employer], letter or phone within 4 business days after the clinic has received the results. If you do not hear from us within 7 days, please contact the clinic through [employer] or phone. If you have a critical or abnormal lab result, we will notify you by phone as soon as possible.  Submit refill requests through [employer] or call your pharmacy and they will forward the refill request to us. Please allow 3 business days for your refill to be completed.          Additional Information About Your Visit        MyChart Information     [employer] gives you secure access to your electronic health record. If you see a primary care provider, you can also send messages to your care team and make appointments. If you have questions, please call your primary care clinic.  If you do not have a primary care provider, please call [phone] and they will assist you.        Care EveryWhere ID     This is your Care EveryWhere ID. This could be used by  "other organizations to access your [place] medical records  [id number]        Your Vitals Were     Pulse Height Last Period BMI (Body Mass Index)          72 5' 4\" (1.626 m) 09/02/2018 32.44 kg/m2         Blood Pressure from Last 3 Encounters:   10/02/18 110/80   10/02/18 110/80   09/26/18 128/77    Weight from Last 3 Encounters:   10/02/18 189 lb (85.7 kg)   10/02/18 186 lb (84.4 kg)   09/14/18 189 lb 6.4 oz (85.9 kg)              Today, you had the following     No orders found for display       Primary Care Provider Fax #    Physician No Ref-Primary [phone]       No address on file        Equal Access to Services     [facility] : Lennox Byrd, wahéctorda luqadaha, qaybta kaalmada adekareemyahunter, arthur escalante . So [facility] [phone].    ATENCIÓN: Si habla español, tiene a hickey disposición servicios gratuitos de asistencia lingüística. Llame al [phone].    We comply with applicable federal civil rights laws and [place] laws. We do not discriminate on the basis of race, color, national origin, age, disability, sex, sexual orientation, or gender identity.            Thank you!     Thank you for choosing [facility]  for your care. Our goal is always to provide you with excellent care. Hearing back from our patients is one way we can continue to improve our services. Please take a few minutes to complete the written survey that you may receive in the mail after your visit with us. Thank you!             Your Updated Medication List - Protect others around you: Learn how to safely use, store and throw away your medicines at www.disposemymeds.org.      Notice  As of 10/2/2018  2:06 PM    You have not been prescribed any medications.      "

## 2018-10-02 NOTE — PROGRESS NOTES
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  Is currently on OCPs but would like to switch to an IUD so she is not having to take a pill on a regular basis.  Had no issues with OCPs.  Is well acquainted with the IUD as she has spent time working with [facility].    Just had a complete physical with Jasmine Chavez.  I refer back to her dictation from 2018    GYN review of systems is otherwise negative    We discussed to both Mirena and Nitish and made the decision to proceed with the Kyleena based on the tightness of her cervix at the time of dilation.    Routine pre-procedure instructions were given to the patient  A Timeout was then performed  Betadine prep to cervix  Cervix carefully dilated and the uterus sounded as needed to 7+ cm  A Kylena was placed to 7 cms, released in usual fashion and pressed against the fundus.  The strings were cut to 3 cms of length    The procedure was tolerated well  Routine instructions were then given.  F/U 4 weeks

## 2018-10-02 NOTE — MR AVS SNAPSHOT
After Visit Summary   10/2/2018    Matilde Mancilla    MRN: [id number]           Patient Information     Date Of Birth          1995        Visit Information        Provider Department      10/2/2018 12:45 PM Doug Hanson MD [facility]        Today's Diagnoses     Pre-procedural examination    -  1    Encounter for IUD insertion           Follow-ups after your visit        Your next 10 appointments already scheduled     Oct 02, 2018  2:50 PM CDT   New Visit with Corrie Edwards MD   [facility] ([facility])    [address]  [place] [id number] [phone]              Who to contact     If you have questions or need follow up information about today's clinic visit or your schedule please contact [facility] directly at [phone].  Normal or non-critical lab and imaging results will be communicated to you by Vaccinogenhart, letter or phone within 4 business days after the clinic has received the results. If you do not hear from us within 7 days, please contact the clinic through Vaccinogenhart or phone. If you have a critical or abnormal lab result, we will notify you by phone as soon as possible.  Submit refill requests through SoundCure or call your pharmacy and they will forward the refill request to us. Please allow 3 business days for your refill to be completed.          Additional Information About Your Visit        MyChart Information     SoundCure gives you secure access to your electronic health record. If you see a primary care provider, you can also send messages to your care team and make appointments. If you have questions, please call your primary care clinic.  If you do not have a primary care provider, please call [phone] and they will assist you.        Care EveryWhere ID     This is your Care EveryWhere ID. This could be used by other organizations to access your [facility]  records  [id number]        Your Vitals Were     Pulse Last Period Breastfeeding? BMI (Body Mass Index)          72 09/02/2018 No 31.37 kg/m2         Blood Pressure from Last 3 Encounters:   10/02/18 110/80   09/26/18 128/77   09/14/18 110/80    Weight from Last 3 Encounters:   10/02/18 84.4 kg (186 lb)   09/14/18 85.9 kg (189 lb 6.4 oz)   08/21/17 77 kg (169 lb 12.8 oz)              We Performed the Following     INSERTION INTRAUTERINE DEVICE     Pregnancy, Urine (HCG)     Pregnancy, Urine (HCG)        Primary Care Provider Fax #    Physician No Ref-Primary [phone]       No address on file        Equal Access to Services     HANNAH LEMA : Lennox Byrd, socorro dickens, sylvia urbano, arthur iraheta. So [facility] [phone].    ATENCIÓN: Si habla español, tiene a hickey disposición servicios gratuitos de asistencia lingüística. Llame al [phone].    We comply with applicable federal civil rights laws and [place] laws. We do not discriminate on the basis of race, color, national origin, age, disability, sex, sexual orientation, or gender identity.            Thank you!     Thank you for choosing [facility] AND [facility]  for your care. Our goal is always to provide you with excellent care. Hearing back from our patients is one way we can continue to improve our services. Please take a few minutes to complete the written survey that you may receive in the mail after your visit with us. Thank you!             Your Updated Medication List - Protect others around you: Learn how to safely use, store and throw away your medicines at www.disposemymeds.org.      Notice  As of 10/2/2018  2:20 PM    You have not been prescribed any medications.

## 2018-10-02 NOTE — NURSING NOTE
"Chief Complaint   Patient presents with     IUD     IUD placement      Pregnancy test done and consent signed  Prior to the start of the procedure and with procedural staff participation, I verbally confirmed the patient s identity using two indicators, relevant allergies, that the procedure was appropriate and matched the consent or emergent situation, and that the correct equipment/implants were available. Immediately prior to starting the procedure I conducted the Time Out with the procedural staff and re-confirmed the patient s name, procedure, and site/side. (The Joint Commission universal protocol was followed.)  Yes    Sedation (Moderate or Deep): None  Chief Complaint   Patient presents with     IUD     IUD placement        Initial /80 (BP Location: Right arm, Patient Position: Sitting, Cuff Size: Adult Regular)  Pulse 72  Wt 84.4 kg (186 lb)  LMP 09/02/2018  Breastfeeding? No  BMI 31.37 kg/m2 Brielle Lizarraga LPN    Initial LMP 09/02/2018 Estimated body mass index is 31.94 kg/(m^2) as calculated from the following:    Height as of 8/21/17: 1.64 m (5' 4.57\").    Weight as of 9/14/18: 85.9 kg (189 lb 6.4 oz).  Medication Reconciliation: complete    Brielle Lizarraga LPN  "

## 2018-10-03 PROBLEM — N63.23 BREAST LUMP ON LEFT SIDE AT 5 O'CLOCK POSITION: Status: ACTIVE | Noted: 2018-10-03

## 2018-10-08 RX ORDER — NORGESTIMATE AND ETHINYL ESTRADIOL 7DAYSX3 28
KIT ORAL
Qty: 84 TABLET | OUTPATIENT
Start: 2018-10-08

## 2018-10-08 NOTE — TELEPHONE ENCOUNTER
[facility] sent Rx request for the following:   NORGEST TAB EE TRI 28  TAKE 1 TABLET BY MOUTH ONCE DAILY  Last Written Prescription Date:  8/21/17  Last Fill Quantity: 84,   # refills: 4  (Discontinued 10/2/18)  Drug not active on patient's medication list    Per notes OB-GYN visit with Doug Hanosn, on 10/2/18, Pt switched from OCPs and had IUD placed that same day.    Refill request refused at this time, with note to pharmacy stating medication has been discontinued. Unable to complete prescription refill per RN Medication Refill Policy. Homa Alanis RN .............. 10/8/2018  2:58 PM

## 2019-04-29 ENCOUNTER — HOSPITAL ENCOUNTER (OUTPATIENT)
Dept: ULTRASOUND IMAGING | Facility: OTHER | Age: 24
Discharge: HOME | End: 2019-04-29
Attending: PHYSICIAN ASSISTANT | Admitting: PHYSICIAN ASSISTANT
Payer: COMMERCIAL

## 2019-04-29 DIAGNOSIS — N63.20 LEFT BREAST MASS: ICD-10-CM

## 2019-04-29 PROCEDURE — 76642 ULTRASOUND BREAST LIMITED: CPT | Mod: LT

## 2019-06-20 ENCOUNTER — TELEPHONE (OUTPATIENT)
Dept: FAMILY MEDICINE | Facility: OTHER | Age: 24
End: 2019-06-20

## 2019-06-20 NOTE — TELEPHONE ENCOUNTER
Left message to call back to answer asthma questions.  Renita Valles LPN ....................  6/20/2019   3:28 PM

## 2019-06-20 NOTE — LETTER
My Asthma Action Plan  Name: Matilde Mancilla   YOB: 1995  Date: 6/20/2019   My doctor: Jasmine Chavez PA-C   My clinic: [facility] AND [facility]""        My Control Medicine: None  My Rescue Medicine: None   My Asthma Severity: intermittent  Avoid your asthma triggers: None               GREEN ZONE   Good Control    I feel good    No cough or wheeze    Can work, sleep and play without asthma symptoms       Take your asthma control medicine every day.     1. If exercise triggers your asthma, take your rescue medication    15 minutes before exercise or sports, and    During exercise if you have asthma symptoms  2. Spacer to use with inhaler: If you have a spacer, make sure to use it with your inhaler             YELLOW ZONE Getting Worse  I have ANY of these:    I do not feel good    Cough or wheeze    Chest feels tight    Wake up at night   1. Keep taking your Green Zone medications  2. Start taking your rescue medicine:    every 20 minutes for up to 1 hour. Then every 4 hours for 24-48 hours.  3. If you stay in the Yellow Zone for more than 12-24 hours, contact your doctor.  4. If you do not return to the Green Zone in 12-24 hours or you get worse, start taking your oral steroid medicine if prescribed by your provider.           RED ZONE Medical Alert - Get Help  I have ANY of these:    I feel awful    Medicine is not helping    Breathing getting harder    Trouble walking or talking    Nose opens wide to breathe       1. Take your rescue medicine NOW  2. If your provider has prescribed an oral steroid medicine, start taking it NOW  3. Call your doctor NOW  4. If you are still in the Red Zone after 20 minutes and you have not reached your doctor:    Take your rescue medicine again and    Call 911 or go to the emergency room right away    See your regular doctor within 2 weeks of an Emergency Room or Urgent Care visit for follow-up treatment.          Annual Reminders:  Meet with Asthma  Educator,  Flu Shot in the Fall, consider Pneumonia Vaccination for patients with asthma (aged 19 and older).    Pharmacy: [facility] [address] [place] [address]                      Asthma Triggers  How To Control Things That Make Your Asthma Worse    Triggers are things that make your asthma worse.  Look at the list below to help you find your triggers and what you can do about them.  You can help prevent asthma flare-ups by staying away from your triggers.      Trigger                                                          What you can do   Cigarette Smoke  Tobacco smoke can make asthma worse. Do not allow smoking in your home, car or around you.  Be sure no one smokes at a child s day care or school.  If you smoke, ask your health care provider for ways to help you quit.  Ask family members to quit too.  Ask your health care provider for a referral to Quit Plan to help you quit smoking, or call 6-999-460-PLAN.     Colds, Flu, Bronchitis  These are common triggers of asthma. Wash your hands often.  Don t touch your eyes, nose or mouth.  Get a flu shot every year.     Dust Mites  These are tiny bugs that live in cloth or carpet. They are too small to see. Wash sheets and blankets in hot water every week.   Encase pillows and mattress in dust mite proof covers.  Avoid having carpet if you can. If you have carpet, vacuum weekly.   Use a dust mask and HEPA vacuum.   Pollen and Outdoor Mold  Some people are allergic to trees, grass, or weed pollen, or molds. Try to keep your windows closed.  Limit time out doors when pollen count is high.   Ask you health care provider about taking medicine during allergy season.     Animal Dander  Some people are allergic to skin flakes, urine or saliva from pets with fur or feathers. Keep pets with fur or feathers out of your home.    If you can t keep the pet outdoors, then keep the pet out of your bedroom.  Keep the bedroom door closed.  Keep pets off cloth  furniture and away from stuffed toys.     Mice, Rats, and Cockroaches  Some people are allergic to the waste from these pests.   Cover food and garbage.  Clean up spills and food crumbs.  Store grease in the refrigerator.   Keep food out of the bedroom.   Indoor Mold  This can be a trigger if your home has high moisture. Fix leaking faucets, pipes, or other sources of water.   Clean moldy surfaces.  Dehumidify basement if it is damp and smelly.   Smoke, Strong Odors, and Sprays  These can reduce air quality. Stay away from strong odors and sprays, such as perfume, powder, hair spray, paints, smoke incense, paint, cleaning products, candles and new carpet.   Exercise or Sports  Some people with asthma have this trigger. Be active!  Ask your doctor about taking medicine before sports or exercise to prevent symptoms.    Warm up for 5-10 minutes before and after sports or exercise.     Other Triggers of Asthma  Cold air:  Cover your nose and mouth with a scarf.  Sometimes laughing or crying can be a trigger.  Some medicines and food can trigger asthma.

## 2019-06-20 NOTE — TELEPHONE ENCOUNTER
Act answered on the phone action plan done.  Renita Valles LPN........................6/20/2019  3:51 PM

## 2019-06-21 ASSESSMENT — ASTHMA QUESTIONNAIRES: ACT_TOTALSCORE: 25

## 2019-12-23 ENCOUNTER — OFFICE VISIT (OUTPATIENT)
Dept: FAMILY MEDICINE | Facility: OTHER | Age: 24
End: 2019-12-23
Attending: FAMILY MEDICINE
Payer: COMMERCIAL

## 2019-12-23 VITALS
RESPIRATION RATE: 18 BRPM | DIASTOLIC BLOOD PRESSURE: 68 MMHG | HEIGHT: 66 IN | BODY MASS INDEX: 33.56 KG/M2 | SYSTOLIC BLOOD PRESSURE: 122 MMHG | WEIGHT: 208.8 LBS | HEART RATE: 72 BPM | TEMPERATURE: 98.1 F

## 2019-12-23 DIAGNOSIS — Z00.00 ROUTINE GENERAL MEDICAL EXAMINATION AT A HEALTH CARE FACILITY: Primary | ICD-10-CM

## 2019-12-23 PROCEDURE — 90686 IIV4 VACC NO PRSV 0.5 ML IM: CPT | Performed by: FAMILY MEDICINE

## 2019-12-23 PROCEDURE — 90471 IMMUNIZATION ADMIN: CPT | Performed by: FAMILY MEDICINE

## 2019-12-23 PROCEDURE — 99395 PREV VISIT EST AGE 18-39: CPT | Mod: 25 | Performed by: FAMILY MEDICINE

## 2019-12-23 ASSESSMENT — MIFFLIN-ST. JEOR: SCORE: 1714.89

## 2019-12-23 ASSESSMENT — PAIN SCALES - GENERAL: PAINLEVEL: NO PAIN (0)

## 2019-12-23 NOTE — NURSING NOTE
"Patient here for yearly physical and has one concerning lesion she wants checked along with the others.  Brielle Browne LPN ..........12/23/2019 11:43 AM   Chief Complaint   Patient presents with     Physical     yearly & skin check.       Initial /68 (BP Location: Right arm, Patient Position: Sitting, Cuff Size: Adult Regular)   Pulse 72   Temp 98.1  F (36.7  C) (Temporal)   Resp 18   Ht 1.67 m (5' 5.75\")   Wt 94.7 kg (208 lb 12.8 oz)   BMI 33.96 kg/m   Estimated body mass index is 33.96 kg/m  as calculated from the following:    Height as of this encounter: 1.67 m (5' 5.75\").    Weight as of this encounter: 94.7 kg (208 lb 12.8 oz).  Medication Reconciliation: complete    Brielle Browne LPN    "

## 2019-12-23 NOTE — PROGRESS NOTES
SUBJECTIVE:   CC: Matilde Mancilla is an 24 year old woman who presents for preventive health visit.     Healthy Habits:    Do you get at least three servings of calcium containing foods daily (dairy, green leafy vegetables, etc.)? yes    Amount of exercise or daily activities, outside of work: 4-5 day(s) per week    Problems taking medications regularly No    Medication side effects: No    Have you had an eye exam in the past two years? no    Do you see a dentist twice per year? no    Do you have sleep apnea, excessive snoring or daytime drowsiness?no      Has a mole she wants to look at.  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  Is slightly growing.  In under left breast in LUQ.    Today's PHQ-2 Score:   PHQ-2 ( 1999 Pfizer) 12/23/2019 10/2/2018   Q1: Little interest or pleasure in doing things 0 0   Q2: Feeling down, depressed or hopeless 0 0   PHQ-2 Score 0 0       Abuse: Current or Past(Physical, Sexual or Emotional)- No  Do you feel safe in your environment? Yes        Social History     Tobacco Use     Smoking status: Never Smoker     Smokeless tobacco: Never Used   Substance Use Topics     Alcohol use: Yes     Alcohol/week: 0.0 standard drinks     Comment: 3 times a month     If you drink alcohol do you typically have >3 drinks per day or >7 drinks per week? No                     Reviewed orders with patient.  Reviewed health maintenance and updated orders accordingly - Yes  No current outpatient medications on file.     No Known Allergies    Mammogram not appropriate for this patient based on age.    Pertinent mammograms are reviewed under the imaging tab.  History of abnormal Pap smear: NO - age 21-29 PAP every 3 years recommended     Reviewed and updated as needed this visit by clinical staff  Tobacco  Allergies  Meds  Med Hx  Surg Hx  Fam Hx  Soc Hx        Reviewed and updated as needed this visit by Provider        Past Medical History:   Diagnosis Date     Acne vulgaris     No  "Comments Provided     Exercise-induced bronchospasm     improved with time     IUD (intrauterine device) in place 10/02/2018    Kyleena placed 10/02/18      Past Surgical History:   Procedure Laterality Date     NO HISTORY OF SURGERY         ROS:  CONSTITUTIONAL: NEGATIVE for fever, chills, change in weight  INTEGUMENTARU/SKIN: NEGATIVE for worrisome rashes, moles or lesions  EYES: NEGATIVE for vision changes or irritation  ENT: NEGATIVE for ear, mouth and throat problems  RESP: NEGATIVE for significant cough or SOB  BREAST: NEGATIVE for masses, tenderness or discharge  CV: NEGATIVE for chest pain, palpitations or peripheral edema  GI: NEGATIVE for nausea, abdominal pain, heartburn, or change in bowel habits  : NEGATIVE for unusual urinary or vaginal symptoms. Periods are regular.  MUSCULOSKELETAL: NEGATIVE for significant arthralgias or myalgia  NEURO: NEGATIVE for weakness, dizziness or paresthesias  PSYCHIATRIC: NEGATIVE for changes in mood or affect  Skin see above.    OBJECTIVE:   /68 (BP Location: Right arm, Patient Position: Sitting, Cuff Size: Adult Regular)   Pulse 72   Temp 98.1  F (36.7  C) (Temporal)   Resp 18   Ht 1.67 m (5' 5.75\")   Wt 94.7 kg (208 lb 12.8 oz)   BMI 33.96 kg/m    EXAM:  GENERAL: healthy, alert and no distress  EYES: Eyes grossly normal to inspection, PERRL and conjunctivae and sclerae normal  HENT: ear canals and TM's normal, nose and mouth without ulcers or lesions  NECK: no adenopathy, no asymmetry, masses, or scars and thyroid normal to palpation.  Mild enlargement of thyroid on exam, not tender.    RESP: lungs clear to auscultation - no rales, rhonchi or wheezes  CV: regular rate and rhythm, normal S1 S2, no S3 or S4, no murmur, click or rub, no peripheral edema and peripheral pulses strong  ABDOMEN: soft, nontender, no hepatosplenomegaly, no masses and bowel sounds normal  MS: no gross musculoskeletal defects noted, no edema  SKIN: luq with 4 mm tan oval papule.  " "Without ulcerations.    NEURO: Normal strength and tone, mentation intact and speech normal  PSYCH: mentation appears normal, affect normal/bright        ASSESSMENT/PLAN:       ICD-10-CM    1. Routine general medical examination at a health care facility Z00.00 INFLUENZA VACCINE IM > 6 MONTHS VALENT IIV4 [31225]     Offered imaging of her thyroid, she wants to observe.      COUNSELING:   Reviewed preventive health counseling, as reflected in patient instructions       Regular exercise       Healthy diet/nutrition    Estimated body mass index is 33.96 kg/m  as calculated from the following:    Height as of this encounter: 1.67 m (5' 5.75\").    Weight as of this encounter: 94.7 kg (208 lb 12.8 oz).    Weight management plan: Discussed healthy diet and exercise guidelines     reports that she has never smoked. She has never used smokeless tobacco.      Counseling Resources:  ATP IV Guidelines  Pooled Cohorts Equation Calculator  Breast Cancer Risk Calculator  FRAX Risk Assessment  ICSI Preventive Guidelines  Dietary Guidelines for Americans, 2010  USDA's MyPlate  ASA Prophylaxis  Lung CA Screening    Ludin Merlos MD  [facility]  "

## 2020-11-25 ENCOUNTER — OFFICE VISIT (OUTPATIENT)
Dept: FAMILY MEDICINE | Facility: OTHER | Age: 25
End: 2020-11-25
Attending: FAMILY MEDICINE
Payer: COMMERCIAL

## 2020-11-25 VITALS
OXYGEN SATURATION: 99 % | WEIGHT: 189.8 LBS | RESPIRATION RATE: 16 BRPM | HEART RATE: 82 BPM | SYSTOLIC BLOOD PRESSURE: 120 MMHG | DIASTOLIC BLOOD PRESSURE: 72 MMHG | TEMPERATURE: 98.2 F | BODY MASS INDEX: 31.62 KG/M2 | HEIGHT: 65 IN

## 2020-11-25 DIAGNOSIS — Z02.0 SCHOOL PHYSICAL EXAM: Primary | ICD-10-CM

## 2020-11-25 DIAGNOSIS — Z20.822 EXPOSURE TO COVID-19 VIRUS: ICD-10-CM

## 2020-11-25 PROCEDURE — 86769 SARS-COV-2 COVID-19 ANTIBODY: CPT | Performed by: FAMILY MEDICINE

## 2020-11-25 PROCEDURE — 86615 BORDETELLA ANTIBODY: CPT | Mod: ZL | Performed by: FAMILY MEDICINE

## 2020-11-25 PROCEDURE — 86317 IMMUNOASSAY INFECTIOUS AGENT: CPT | Mod: ZL | Performed by: FAMILY MEDICINE

## 2020-11-25 PROCEDURE — 86706 HEP B SURFACE ANTIBODY: CPT | Mod: ZL | Performed by: FAMILY MEDICINE

## 2020-11-25 PROCEDURE — 36415 COLL VENOUS BLD VENIPUNCTURE: CPT | Mod: ZL | Performed by: FAMILY MEDICINE

## 2020-11-25 PROCEDURE — 99213 OFFICE O/P EST LOW 20 MIN: CPT | Performed by: FAMILY MEDICINE

## 2020-11-25 PROCEDURE — 86735 MUMPS ANTIBODY: CPT | Mod: ZL | Performed by: FAMILY MEDICINE

## 2020-11-25 PROCEDURE — 86774 TETANUS ANTIBODY: CPT | Mod: ZL | Performed by: FAMILY MEDICINE

## 2020-11-25 PROCEDURE — 84999 UNLISTED CHEMISTRY PROCEDURE: CPT | Mod: ZL | Performed by: FAMILY MEDICINE

## 2020-11-25 PROCEDURE — 86658 ENTEROVIRUS ANTIBODY: CPT | Mod: ZL | Performed by: FAMILY MEDICINE

## 2020-11-25 PROCEDURE — 86765 RUBEOLA ANTIBODY: CPT | Mod: ZL | Performed by: FAMILY MEDICINE

## 2020-11-25 PROCEDURE — 86580 TB INTRADERMAL TEST: CPT | Performed by: FAMILY MEDICINE

## 2020-11-25 PROCEDURE — 86787 VARICELLA-ZOSTER ANTIBODY: CPT | Mod: ZL | Performed by: FAMILY MEDICINE

## 2020-11-25 PROCEDURE — 86648 DIPHTHERIA ANTIBODY: CPT | Mod: ZL | Performed by: FAMILY MEDICINE

## 2020-11-25 PROCEDURE — 86615 BORDETELLA ANTIBODY: CPT | Mod: ZL,91 | Performed by: FAMILY MEDICINE

## 2020-11-25 PROCEDURE — 86762 RUBELLA ANTIBODY: CPT | Mod: ZL | Performed by: FAMILY MEDICINE

## 2020-11-25 PROCEDURE — 86803 HEPATITIS C AB TEST: CPT | Mod: ZL | Performed by: FAMILY MEDICINE

## 2020-11-25 RX ADMIN — Medication 5 UNITS: at 08:35

## 2020-11-25 ASSESSMENT — ENCOUNTER SYMPTOMS
DYSPHORIC MOOD: 0
CHILLS: 0
FEVER: 0

## 2020-11-25 ASSESSMENT — PAIN SCALES - GENERAL: PAINLEVEL: NO PAIN (0)

## 2020-11-25 ASSESSMENT — MIFFLIN-ST. JEOR: SCORE: 1611.81

## 2020-11-25 NOTE — NURSING NOTE
"Chief Complaint   Patient presents with     School Physical     Medical student          Initial /72   Pulse 82   Temp 98.2  F (36.8  C) (Temporal)   Resp 16   Ht 1.651 m (5' 5\")   Wt 86.1 kg (189 lb 12.8 oz)   SpO2 99%   BMI 31.58 kg/m   Estimated body mass index is 31.58 kg/m  as calculated from the following:    Height as of this encounter: 1.651 m (5' 5\").    Weight as of this encounter: 86.1 kg (189 lb 12.8 oz).    Medication Reconciliation: complete      Norma J. Gosselin, LPN  "

## 2020-11-25 NOTE — NURSING NOTE
"Chief Complaint   Patient presents with     School Physical     Medical student          Initial /72   Pulse 82   Temp 98.2  F (36.8  C) (Temporal)   Resp 16   Ht 1.651 m (5' 5\")   Wt 86.1 kg (189 lb 12.8 oz)   SpO2 99%   BMI 31.58 kg/m   Estimated body mass index is 31.58 kg/m  as calculated from the following:    Height as of this encounter: 1.651 m (5' 5\").    Weight as of this encounter: 86.1 kg (189 lb 12.8 oz).    Medication Reconciliation: complete      Norma J. Gosselin, LPN Mantoux given today 11/25/20 at 0835  "

## 2020-11-25 NOTE — PROGRESS NOTES
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  Her last physical exam was 12/23/19, and findings were within normal limits at that time.  She is due for cervical cancer screening with pap smear.  She would also like to be tested for COVID antibodies.  Her boyfriend tested positive at the beginning of October.  She never developed any symptoms and did not get tested.    Past Medical History:   Diagnosis Date     Acne vulgaris     No Comments Provided     Exercise-induced bronchospasm     improved with time     IUD (intrauterine device) in place 10/02/2018    Kyleena placed 10/02/18      Past Surgical History:   Procedure Laterality Date     NO HISTORY OF SURGERY       Family History   Problem Relation Age of Onset     Asthma Mother         Asthma     Allergy (Severe) Mother         Allergies     Hyperparathyroidism Mother      Hypertension Mother      Family History Negative Father         Good Health,Does not go to doctor regularly     Other - See Comments Maternal Grandmother         aneurysm     Social History     Tobacco Use     Smoking status: Never Smoker     Smokeless tobacco: Never Used   Substance Use Topics     Alcohol use: Yes     Alcohol/week: 0.0 standard drinks     Comment: 3 times a month     No current outpatient medications on file.     No Known Allergies    Review of Systems   Constitutional: Negative for chills and fever.   Psychiatric/Behavioral: Negative for dysphoric mood.      OBJECTIVE:     /72   Pulse 82   Temp 98.2  F (36.8  C) (Temporal)   Resp 16   Ht 1.651 m (5' 5\")   Wt 86.1 kg (189 lb 12.8 oz)   SpO2 99%   BMI 31.58 kg/m    Body mass index is 31.58 kg/m .  Physical Exam  Constitutional:  "      General: She is not in acute distress.     Appearance: Normal appearance. She is not ill-appearing.   Cardiovascular:      Rate and Rhythm: Normal rate and regular rhythm.      Heart sounds: No murmur. No friction rub. No gallop.    Pulmonary:      Effort: Pulmonary effort is normal.      Breath sounds: Normal breath sounds. No wheezing, rhonchi or rales.   Abdominal:      General: Bowel sounds are normal.      Palpations: Abdomen is soft.      Tenderness: There is no abdominal tenderness. There is no guarding or rebound.   Musculoskeletal:         General: No swelling.   Neurological:      Mental Status: She is alert.   Psychiatric:         Mood and Affect: Mood normal.       ASSESSMENT/PLAN:     1. School physical exam  School form completed with information available.  Titre tests ordered.  Mantoux test ordered and administered.  She will return on Friday for reading.  Will complete remainder of form when test results available.  She will come to pick it up from clinic when it is complete.  She has also given her permission for her mother to  the form if she cannot.  She should follow-up at the end of December for her annual physical and cervical cancer screening.  - tuberculin injection 5 Units  - Hepatitis B Surface Antibody  - Hepatitis C Screen Reflex to HCV RNA Quant and Genotype  - Poliovirus Antibodies  - B pertussis IgA IgG MARCUS Rflx to Immblt  - Diphtheria tetanus antibody panel  - Varicella Zoster Virus Antibody IgG  - Mumps Immune Status, IgG  - Rubella Antibody IgG Quantitative  - Rubeola Antibody IgG    2. Exposure to COVID-19 virus  She meets criteria for antibody testing with known COVID-19 exposure > 14 days ago.  She understands the accuracy of the test and that positive antibodies due to preclude her from following mask, hand washing, and social distancing guidelines.  Test ordered.  - COVID-19 Virus (Coronavirus) Antibody & Titer Reflex      Imani Duran DO  [facility]  AND HOSPITAL

## 2020-11-25 NOTE — LETTER
My Asthma Action Plan    Name: Matilde Mancilla   YOB: 1995  Date: 11/25/2020   My doctor: Imani Duran, DO   My clinic: [facility]        My Rescue Medicine:   Albuterol inhaler (Proair/Ventolin/Proventil HFA)  2-4 puffs EVERY 4 HOURS as needed. Use a spacer if recommended by your provider.   My Asthma Severity:   Intermittent / Exercise Induced  Know your asthma triggers: exercise or sports             GREEN ZONE   Good Control    I feel good    No cough or wheeze    Can work, sleep and play without asthma symptoms       Take your asthma control medicine every day.     1. If exercise triggers your asthma, take your rescue medication    15 minutes before exercise or sports, and    During exercise if you have asthma symptoms  2. Spacer to use with inhaler: If you have a spacer, make sure to use it with your inhaler             YELLOW ZONE Getting Worse  I have ANY of these:    I do not feel good    Cough or wheeze    Chest feels tight    Wake up at night   1. Keep taking your Green Zone medications  2. Start taking your rescue medicine:    every 20 minutes for up to 1 hour. Then every 4 hours for 24-48 hours.  3. If you stay in the Yellow Zone for more than 12-24 hours, contact your doctor.  4. If you do not return to the Green Zone in 12-24 hours or you get worse, start taking your oral steroid medicine if prescribed by your provider.           RED ZONE Medical Alert - Get Help  I have ANY of these:    I feel awful    Medicine is not helping    Breathing getting harder    Trouble walking or talking    Nose opens wide to breathe       1. Take your rescue medicine NOW  2. If your provider has prescribed an oral steroid medicine, start taking it NOW  3. Call your doctor NOW  4. If you are still in the Red Zone after 20 minutes and you have not reached your doctor:    Take your rescue medicine again and    Call 911 or go to the emergency room right away    See your regular  doctor within 2 weeks of an Emergency Room or Urgent Care visit for follow-up treatment.          Annual Reminders:  Meet with Asthma Educator,  Flu Shot in the Fall, consider Pneumonia Vaccination for patients with asthma (aged 19 and older).    Pharmacy:    [facility] - [address] [address] [place] [employer] - [facility] [address]    Electronically signed by Imani Duran DO   Date: 11/25/20                    Asthma Triggers  How To Control Things That Make Your Asthma Worse    Triggers are things that make your asthma worse.  Look at the list below to help you find your triggers and   what you can do about them. You can help prevent asthma flare-ups by staying away from your triggers.      Trigger                                                          What you can do   Cigarette Smoke  Tobacco smoke can make asthma worse. Do not allow smoking in your home, car or around you.  Be sure no one smokes at a child s day care or school.  If you smoke, ask your health care provider for ways to help you quit.  Ask family members to quit too.  Ask your health care provider for a referral to Quit Plan to help you quit smoking, or call 4-575-462PLAN.     Colds, Flu, Bronchitis  These are common triggers of asthma. Wash your hands often.  Don t touch your eyes, nose or mouth.  Get a flu shot every year.     Dust Mites  These are tiny bugs that live in cloth or carpet. They are too small to see. Wash sheets and blankets in hot water every week.   Encase pillows and mattress in dust mite proof covers.  Avoid having carpet if you can. If you have carpet, vacuum weekly.   Use a dust mask and HEPA vacuum.   Pollen and Outdoor Mold  Some people are allergic to trees, grass, or weed pollen, or molds. Try to keep your windows closed.  Limit time out doors when pollen count is high.   Ask you health care provider about taking medicine during allergy season.     Animal  Dander  Some people are allergic to skin flakes, urine or saliva from pets with fur or feathers. Keep pets with fur or feathers out of your home.    If you can t keep the pet outdoors, then keep the pet out of your bedroom.  Keep the bedroom door closed.  Keep pets off cloth furniture and away from stuffed toys.     Mice, Rats, and Cockroaches  Some people are allergic to the waste from these pests.   Cover food and garbage.  Clean up spills and food crumbs.  Store grease in the refrigerator.   Keep food out of the bedroom.   Indoor Mold  This can be a trigger if your home has high moisture. Fix leaking faucets, pipes, or other sources of water.   Clean moldy surfaces.  Dehumidify basement if it is damp and smelly.   Smoke, Strong Odors, and Sprays  These can reduce air quality. Stay away from strong odors and sprays, such as perfume, powder, hair spray, paints, smoke incense, paint, cleaning products, candles and new carpet.   Exercise or Sports  Some people with asthma have this trigger. Be active!  Ask your doctor about taking medicine before sports or exercise to prevent symptoms.    Warm up for 5-10 minutes before and after sports or exercise.     Other Triggers of Asthma  Cold air:  Cover your nose and mouth with a scarf.  Sometimes laughing or crying can be a trigger.  Some medicines and food can trigger asthma.

## 2020-11-26 ASSESSMENT — ASTHMA QUESTIONNAIRES: ACT_TOTALSCORE: 25

## 2020-11-27 ENCOUNTER — ALLIED HEALTH/NURSE VISIT (OUTPATIENT)
Dept: FAMILY MEDICINE | Facility: OTHER | Age: 25
End: 2020-11-27
Payer: COMMERCIAL

## 2020-11-27 DIAGNOSIS — Z11.1 TUBERCULIN SKIN TEST READING ENCOUNTER: Primary | ICD-10-CM

## 2020-11-27 LAB
HBV SURFACE AB SERPL IA-ACNC: 142.67 M[IU]/ML
HCV AB SERPL QL IA: NONREACTIVE
MEV IGG SER QL IA: 6.9 AI (ref 0–0.8)
MUV IGG SER QL IA: 1.5 AI (ref 0–0.8)
RUBV IGG SERPL IA-ACNC: 14 IU/ML
VZV IGG SER QL IA: 0.4 AI (ref 0–0.8)

## 2020-11-27 NOTE — PROGRESS NOTES
Mantoux results: No induration.  No swelling.  No redness.  Sathish Goncalves RN, BSN  ....................  11/27/2020   10:59 AM

## 2020-11-28 LAB
COVID-19 SPIKE RBD ABY TITER: NORMAL
COVID-19 SPIKE RBD ABY: NEGATIVE

## 2020-11-29 LAB
B PERT IGA SER IA-ACNC: 0.3 IV
B PERT IGG SER IA-ACNC: 3.02 IV

## 2020-11-30 LAB
B PERT AB SPEC QL: ABNORMAL
B PERT FHA IGG SER QL: POSITIVE
B PERT IGG SER QL IB: POSITIVE
B PERT IGG SER QL: ABNORMAL

## 2020-12-05 LAB
PV1 NAB TITR SER NT: NORMAL {TITER}
PV3 NAB TITR SER NT: NORMAL {TITER}

## 2020-12-15 LAB
C DIPHTHERIAE IGG SER IA-ACNC: NORMAL IU/ML
C TETANI IGG SER IA-ACNC: 0.55 IU/ML

## 2020-12-17 LAB
RESULT: NORMAL
SEND OUTS MISC TEST CODE: NORMAL
SEND OUTS MISC TEST SPECIMEN: NORMAL
TEST NAME: NORMAL

## 2021-01-03 ENCOUNTER — HEALTH MAINTENANCE LETTER (OUTPATIENT)
Age: 26
End: 2021-01-03

## 2021-01-05 ENCOUNTER — MYC MEDICAL ADVICE (OUTPATIENT)
Dept: FAMILY MEDICINE | Facility: OTHER | Age: 26
End: 2021-01-05

## 2021-01-11 ENCOUNTER — OFFICE VISIT (OUTPATIENT)
Dept: FAMILY MEDICINE | Facility: OTHER | Age: 26
End: 2021-01-11
Attending: PHYSICIAN ASSISTANT
Payer: COMMERCIAL

## 2021-01-11 VITALS
HEIGHT: 65 IN | TEMPERATURE: 98.5 F | HEART RATE: 68 BPM | DIASTOLIC BLOOD PRESSURE: 80 MMHG | BODY MASS INDEX: 32.62 KG/M2 | WEIGHT: 195.8 LBS | SYSTOLIC BLOOD PRESSURE: 110 MMHG | RESPIRATION RATE: 16 BRPM

## 2021-01-11 DIAGNOSIS — Z00.00 ROUTINE HISTORY AND PHYSICAL EXAMINATION OF ADULT: Primary | ICD-10-CM

## 2021-01-11 DIAGNOSIS — Z13.220 SCREENING CHOLESTEROL LEVEL: ICD-10-CM

## 2021-01-11 DIAGNOSIS — Z01.419 PAP TEST, AS PART OF ROUTINE GYNECOLOGICAL EXAMINATION: ICD-10-CM

## 2021-01-11 DIAGNOSIS — Z13.1 SCREENING FOR DIABETES MELLITUS: ICD-10-CM

## 2021-01-11 DIAGNOSIS — Z11.1 SCREENING EXAMINATION FOR PULMONARY TUBERCULOSIS: ICD-10-CM

## 2021-01-11 LAB
CHOLEST SERPL-MCNC: 175 MG/DL
GLUCOSE SERPL-MCNC: 93 MG/DL (ref 70–105)
HDLC SERPL-MCNC: 54 MG/DL (ref 23–92)
LDLC SERPL CALC-MCNC: 96 MG/DL
NONHDLC SERPL-MCNC: 121 MG/DL
TRIGL SERPL-MCNC: 124 MG/DL

## 2021-01-11 PROCEDURE — 86580 TB INTRADERMAL TEST: CPT | Performed by: PHYSICIAN ASSISTANT

## 2021-01-11 PROCEDURE — 82947 ASSAY GLUCOSE BLOOD QUANT: CPT | Mod: ZL | Performed by: PHYSICIAN ASSISTANT

## 2021-01-11 PROCEDURE — G0123 SCREEN CERV/VAG THIN LAYER: HCPCS | Performed by: PHYSICIAN ASSISTANT

## 2021-01-11 PROCEDURE — 36415 COLL VENOUS BLD VENIPUNCTURE: CPT | Mod: ZL | Performed by: PHYSICIAN ASSISTANT

## 2021-01-11 PROCEDURE — 80061 LIPID PANEL: CPT | Mod: ZL | Performed by: PHYSICIAN ASSISTANT

## 2021-01-11 PROCEDURE — 86481 TB AG RESPONSE T-CELL SUSP: CPT | Mod: ZL | Performed by: PHYSICIAN ASSISTANT

## 2021-01-11 PROCEDURE — 99395 PREV VISIT EST AGE 18-39: CPT | Performed by: PHYSICIAN ASSISTANT

## 2021-01-11 RX ADMIN — Medication 5 UNITS: at 09:30

## 2021-01-11 ASSESSMENT — MIFFLIN-ST. JEOR: SCORE: 1639.02

## 2021-01-11 NOTE — PATIENT INSTRUCTIONS
"Please return in 48 to 72 hours with a nurse only appointment to have your Mantoux test read.    Healthy Strategies  1. Eat at least 3 meals a day and never skip breakfast.  2. Eat more slowly.  3. Decrease portion size.  4. Provide structure by using meal replacement bars or shakes, and/or low calorie frozen meals.  5. For good nutrition incorporate fruit, vegetables, whole grains, lean protein, and low-fat dairy.  6. Remove trigger foods from yourenvironment to avoid impulse eating.  7. Increase physical activity: get a pedometer and aim for 10,000 steps a day or 30-35 minutes of activity 5 days per week.  8. Weigh yourself daily or at least weekly.  9. Keep a record of what you eat and your activity.  10. Establish a support system such as afriend, group or program.    11. Read Nabeel Wilkerson's \"Eat to Live\". Remember it is important to have a minimum of 1200 calories a day, okay to use olive oil, 40 grams of fiber daily. No more than two servings (the size of your palm) of red meat a week.     Please consider the following general health recommendations:    Eat a quality diet (generally, low in simple sugars, starches, cholesterol and saturated fat.)    Please get 1200 mg of calcium in divided doses with 800 units vitamin D in your diet daily. Take supplements as needed to obtain full recommended amounts.     Stay physically active. Regular walking or other exercise is one of the best ways to minimize pain of arthritis; maintain independence and mobility; maintain bone strength; maintain conditioning of your heart. Find something you enjoy and a friend to do it with you.    Maintain ideal weight. Your Body mass index is Body mass index is 32.58 kg/m .. Generally a BMI of 20-25 is considered ideal. Overweight is defined as 25-30, obese is 30-35 and markedly obese is greater than 35.    Apply sun block (SPF 25 or greater) on exposed skin anytime you are out in the sun to prevent skin cancer.     Wear a seatbelt " whenever you are in a car.    Obtain a flu shot every fall.    You should have a tetanus booster at least once every 10 years.    Check blood sugar annually. Cholesterol annually unless you have had a normal level when last checked within 5 years.     I recommend that you have a general physical exam every year. You should have a pap test every 3 years between the ages of 21 and 30 and every 3-5 years between the ages of 30 and 65 depending on your test unless you have had previous abnormal pap smears, (in these cases the exams and PAP's should be done on a schedule as recommended by your primary care provider). If you have had hysterectomy in the past, your future Pap plan may be different.

## 2021-01-11 NOTE — NURSING NOTE
Chief Complaint   Patient presents with     Physical         Medication Reconciliation: complete    Vy Louise, LPN

## 2021-01-11 NOTE — PROGRESS NOTES
Nursing Notes:   Vy Louise LPN  1/11/2021  9:03 AM  Signed  Chief Complaint   Patient presents with     Physical         Medication Reconciliation: complete    Vy Louise LPN        ANNUAL PHYSICAL - FEMALE    HPI: Matilde MICHAEL Davilaon who presents for a yearly exam.  Concerns include: Patient is currently going to medical school.  Had a negative Mantoux test completed in November.  Unfortunately she missed the repeat Mantoux test that need to be completed 2 weeks later.  Therefore she needs repeat testing today.  Wondering about getting the QuantiFERON TB Gold plus test.  She is also interested in having a repeat Mantoux test completed today.  No known exposures.  Currently asymptomatic.  No recent fevers, chills, cough or cold symptoms, chest pain, palpitations, problems breathing, GI or urinary symptoms.  Feeling fine.    No LMP recorded. (Menstrual status: IUD).   Contraception: IUD, placed 10/2018, replaced in 5 years  Risk for STI?: none  Last pap: 8/21/2017 - normal, needs to be completed  Any hx of abnormal paps:  no  FH of early CA?: none  Cholesterol/DM concerns/screening: need checked  Tobacco?: no  Calcium intake: yes  DEXA: na  Last mammo: na  Colonoscopy: na  Immunizations: UTD    Patient Active Problem List    Diagnosis Date Noted     Breast lump on left side at 5 o'clock position 10/03/2018     Priority: Medium     Asthma, exercise induced 03/09/2018     Priority: Medium     Contraception management 02/04/2014     Priority: Medium     Other acne 06/15/2011     Priority: Medium       Past Medical History:   Diagnosis Date     Acne vulgaris     No Comments Provided     Exercise-induced bronchospasm     improved with time     IUD (intrauterine device) in place 10/02/2018    Kyleena placed 10/02/18       Past Surgical History:   Procedure Laterality Date     NO HISTORY OF SURGERY         Family History   Problem Relation Age of Onset     Asthma Mother         Asthma      "Allergy (Severe) Mother         Allergies     Hyperparathyroidism Mother      Hypertension Mother      Family History Negative Father         Good Health,Does not go to doctor regularly     Arthritis Brother      Other - See Comments Maternal Grandmother         aneurysm       Social History     Tobacco Use     Smoking status: Never Smoker     Smokeless tobacco: Never Used   Substance Use Topics     Alcohol use: Yes     Alcohol/week: 0.0 standard drinks     Comment: 3 times a month       No current outpatient medications on file.       No Known Allergies    REVIEW OF SYSTEMS:  Refer to HPI.    PHYSICAL EXAM:  /80 (BP Location: Right arm, Patient Position: Sitting, Cuff Size: Adult Regular)   Pulse 68   Temp 98.5  F (36.9  C)   Resp 16   Ht 1.651 m (5' 5\")   Wt 88.8 kg (195 lb 12.8 oz)   BMI 32.58 kg/m    CONSTITUTIONAL:  Alert,cooperative, NAD.  EYES: No scleral icterus.  PERRLA.  Conjunctiva clear.  ENT/MOUTH: External ears and nose normal.  TMs normal.  Moist mucous membranes. Oropharynx clear.    ENDO: No thyromegaly or thyroidnodules.  LYMPH:  No cervical or supraclavicular LA.    BREASTS: No skin abnormalities, no erythema.  No discrete masses.  No nipple discharge, no axillary, supra- or infraclavicular LA.   CARDIOVASCULAR: Regular,S1, S2.  No S3 or S4.  No murmur/gallop/rub.  No peripheral edema.  RESPIRATORY: CTA bilaterally, no wheezes, rhonchi or rales.  GI: Bowel sounds wnl.  Soft, nontender, nondistended.  No masses or HSM.  No rebound or guarding.  : Vulva: normal, no lesions or discharge  Urethral meatus: normal size and location, no lesions or discharge  Urethra: no tenderness or masses  Bladder: no fullness or tenderness  Vagina: normal appearance, no abnormal discharge, no lesions.  No evidence of cystocele or rectocele.  Cervix: normal appearance, no lesions, no abnormal discharge, no cervical motion tenderness.  IUD strings appreciated.  Uterus: normal size and position, mobile, " non-tender  Adnexa: no palpable masses bilaterally. No cervical motion tenderness.  Pap smear obtained: yes  MSKEL: Grossly normal ROM.  No clubbing.  INTEGUMENTARY:  Warm, dry.  No rash noted on exposed skin.  NEUROLOGIC: Facies symmetric.  Grossly normal movement and tone.  No tremor.  PSYCHIATRIC: Affect normal.  Speech fluent.      PHQ Depression Screen  PHQ-9 SCORE 5/8/2015   PHQ-9 Total Score 3       Labs:  No results found for any visits on 01/11/21.    ASSESSMENT AND PLAN:      ICD-10-CM    1. Routine history and physical examination of adult  Z00.00    2. Pap test, as part of routine gynecological examination  Z01.419 Pap Screen Thin Prep only - recommended age 21 - 24 years   3. Screening cholesterol level  Z13.220 Lipid Panel     Lipid Panel   4. Screening for diabetes mellitus  Z13.1 Glucose     Glucose   5. Screening examination for pulmonary tuberculosis  Z11.1 Quantiferon TB Gold Plus     tuberculin injection 5 Units     Quantiferon TB Gold Plus         Completed Pap test.  Results are pending.    Complete lipid panel and glucose for cholesterol diabetes mellitus screening.    Completed QuantiFERON-TB gold plus test and Mantoux test for tuberculosis screening.  Results are pending.  Patient is to return in 48 to 72 hours to have a nurse only appointment to have the Mantoux test read.    Return in 1 year for repeat physical.    Relevant cancer screening discussed.    Counseled on healthy diet, Calcium and vitamin D intake, and exercise.    Patient Instructions   Please return in 48 to 72 hours with a nurse only appointment to have your Mantoux test read.    Healthy Strategies  1. Eat at least 3 meals a day and never skip breakfast.  2. Eat more slowly.  3. Decrease portion size.  4. Provide structure by using meal replacement bars or shakes, and/or low calorie frozen meals.  5. For good nutrition incorporate fruit, vegetables, whole grains, lean protein, and low-fat dairy.  6. Remove trigger foods from  "yourenvironment to avoid impulse eating.  7. Increase physical activity: get a pedometer and aim for 10,000 steps a day or 30-35 minutes of activity 5 days per week.  8. Weigh yourself daily or at least weekly.  9. Keep a record of what you eat and your activity.  10. Establish a support system such as afriend, group or program.    11. Read Nabeel Wilkerson's \"Eat to Live\". Remember it is important to have a minimum of 1200 calories a day, okay to use olive oil, 40 grams of fiber daily. No more than two servings (the size of your palm) of red meat a week.     Please consider the following general health recommendations:    Eat a quality diet (generally, low in simple sugars, starches, cholesterol and saturated fat.)    Please get 1200 mg of calcium in divided doses with 800 units vitamin D in your diet daily. Take supplements as needed to obtain full recommended amounts.     Stay physically active. Regular walking or other exercise is one of the best ways to minimize pain of arthritis; maintain independence and mobility; maintain bone strength; maintain conditioning of your heart. Find something you enjoy and a friend to do it with you.    Maintain ideal weight. Your Body mass index is Body mass index is 32.58 kg/m .. Generally a BMI of 20-25 is considered ideal. Overweight is defined as 25-30, obese is 30-35 and markedly obese is greater than 35.    Apply sun block (SPF 25 or greater) on exposed skin anytime you are out in the sun to prevent skin cancer.     Wear a seatbelt whenever you are in a car.    Obtain a flu shot every fall.    You should have a tetanus booster at least once every 10 years.    Check blood sugar annually. Cholesterol annually unless you have had a normal level when last checked within 5 years.     I recommend that you have a general physical exam every year. You should have a pap test every 3 years between the ages of 21 and 30 and every 3-5 years between the ages of 30 and 65 depending on your " test unless you have had previous abnormal pap smears, (in these cases the exams and PAP's should be done on a schedule as recommended by your primary care provider). If you have had hysterectomy in the past, your future Pap plan may be different.            Jasmine Chavez PA-C PA-C..................1/11/2021 9:00 AM

## 2021-01-13 LAB
GAMMA INTERFERON BACKGROUND BLD IA-ACNC: 0.02 IU/ML
M TB IFN-G CD4+ BCKGRND COR BLD-ACNC: 9.98 IU/ML
M TB TUBERC IFN-G BLD QL: NEGATIVE
MITOGEN IGNF BCKGRD COR BLD-ACNC: 0 IU/ML
MITOGEN IGNF BCKGRD COR BLD-ACNC: 0.04 IU/ML

## 2021-01-14 ENCOUNTER — ALLIED HEALTH/NURSE VISIT (OUTPATIENT)
Dept: FAMILY MEDICINE | Facility: OTHER | Age: 26
End: 2021-01-14
Attending: PHYSICIAN ASSISTANT
Payer: COMMERCIAL

## 2021-01-14 DIAGNOSIS — Z11.1 SCREENING EXAMINATION FOR PULMONARY TUBERCULOSIS: Primary | ICD-10-CM

## 2021-01-14 NOTE — PROGRESS NOTES
"Mantoux result: Negative. No induration or redness. Patient denied need for documentation of mantoux. \"I just received by interferon results.\"      Erlinda RICHARDSONN, RN on 1/14/2021 at 9:30 AM        "

## 2021-01-19 LAB
COPATH REPORT: NORMAL
PAP: NORMAL

## 2021-12-15 ENCOUNTER — OFFICE VISIT (OUTPATIENT)
Dept: FAMILY MEDICINE | Facility: OTHER | Age: 26
End: 2021-12-15
Payer: COMMERCIAL

## 2021-12-15 VITALS
DIASTOLIC BLOOD PRESSURE: 80 MMHG | BODY MASS INDEX: 36.15 KG/M2 | SYSTOLIC BLOOD PRESSURE: 130 MMHG | WEIGHT: 217 LBS | HEIGHT: 65 IN | RESPIRATION RATE: 18 BRPM | HEART RATE: 103 BPM | TEMPERATURE: 98.2 F | OXYGEN SATURATION: 98 %

## 2021-12-15 DIAGNOSIS — N63.23 BREAST LUMP ON LEFT SIDE AT 5 O'CLOCK POSITION: ICD-10-CM

## 2021-12-15 DIAGNOSIS — Z11.1 SCREENING EXAMINATION FOR PULMONARY TUBERCULOSIS: ICD-10-CM

## 2021-12-15 DIAGNOSIS — Z00.00 ROUTINE HISTORY AND PHYSICAL EXAMINATION OF ADULT: Primary | ICD-10-CM

## 2021-12-15 PROCEDURE — 86481 TB AG RESPONSE T-CELL SUSP: CPT | Mod: ZL | Performed by: PHYSICIAN ASSISTANT

## 2021-12-15 PROCEDURE — 36415 COLL VENOUS BLD VENIPUNCTURE: CPT | Mod: ZL | Performed by: PHYSICIAN ASSISTANT

## 2021-12-15 PROCEDURE — 99395 PREV VISIT EST AGE 18-39: CPT | Performed by: PHYSICIAN ASSISTANT

## 2021-12-15 RX ORDER — METHYLPREDNISOLONE 4 MG
TABLET, DOSE PACK ORAL
COMMUNITY
Start: 2021-12-13

## 2021-12-15 RX ORDER — PENICILLIN V POTASSIUM 500 MG/1
TABLET, FILM COATED ORAL
COMMUNITY
Start: 2021-12-13

## 2021-12-15 ASSESSMENT — PATIENT HEALTH QUESTIONNAIRE - PHQ9
SUM OF ALL RESPONSES TO PHQ QUESTIONS 1-9: 0
10. IF YOU CHECKED OFF ANY PROBLEMS, HOW DIFFICULT HAVE THESE PROBLEMS MADE IT FOR YOU TO DO YOUR WORK, TAKE CARE OF THINGS AT HOME, OR GET ALONG WITH OTHER PEOPLE: NOT DIFFICULT AT ALL
SUM OF ALL RESPONSES TO PHQ QUESTIONS 1-9: 0

## 2021-12-15 ASSESSMENT — ANXIETY QUESTIONNAIRES
2. NOT BEING ABLE TO STOP OR CONTROL WORRYING: NOT AT ALL
3. WORRYING TOO MUCH ABOUT DIFFERENT THINGS: NOT AT ALL
7. FEELING AFRAID AS IF SOMETHING AWFUL MIGHT HAPPEN: NOT AT ALL
6. BECOMING EASILY ANNOYED OR IRRITABLE: NOT AT ALL
5. BEING SO RESTLESS THAT IT IS HARD TO SIT STILL: NOT AT ALL
1. FEELING NERVOUS, ANXIOUS, OR ON EDGE: NOT AT ALL
GAD7 TOTAL SCORE: 0
GAD7 TOTAL SCORE: 0
4. TROUBLE RELAXING: NOT AT ALL
7. FEELING AFRAID AS IF SOMETHING AWFUL MIGHT HAPPEN: NOT AT ALL
GAD7 TOTAL SCORE: 0

## 2021-12-15 ASSESSMENT — PAIN SCALES - GENERAL: PAINLEVEL: NO PAIN (0)

## 2021-12-15 ASSESSMENT — MIFFLIN-ST. JEOR: SCORE: 1730.19

## 2021-12-15 NOTE — PROGRESS NOTES
"Nursing Notes:   Jimy Dewey LPN  12/15/2021  8:37 AM  Signed  Chief Complaint   Patient presents with     Physical   Patient denies any concerns or issues.     Initial /80   Pulse 103   Temp 98.2  F (36.8  C) (Tympanic)   Resp 18   Ht 1.651 m (5' 5\")   Wt 98.4 kg (217 lb)   SpO2 98%   Breastfeeding No   BMI 36.11 kg/m   Estimated body mass index is 36.11 kg/m  as calculated from the following:    Height as of this encounter: 1.651 m (5' 5\").    Weight as of this encounter: 98.4 kg (217 lb).  Medication Reconciliation: complete    FOOD SECURITY SCREENING QUESTIONS  Hunger Vital Signs:  Within the past 12 months we worried whether our food would run out before we got money to buy more. Never  Within the past 12 months the food we bought just didn't last and we didn't have money to get more. Never    Advanced Care Directive Reviewed    Jimy Dewey LPN        ANNUAL PHYSICAL - FEMALE    HPI: Matilde Mancilla who presents for a yearly exam. 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 Requesting an updated TB quantiFERON test. She may have had one exposure during rotations, but was not in direct contact with them. Currently asymptomatic. Will do an updated breast exam today. Does not need her IUD checked. She is able to feel her strings. No recent fevers, chills, cough or cold symptoms, chest pain, palpitations, difficulty breathing, GI or urinary symptoms. Had her wisdom teeth (4 total) removed on 12/13/2021. Currently taking IBU and tylenol every 6 hours for pain management. Overall feeling in good health.     Patient is history of having a left breast lump that was biopsied in 2018.  Normal repeat ultrasound in 2019.  No acute concerns at this time.  Declines repeat imaging today.  No changes.    No LMP recorded. (Menstrual status: IUD). spotting every 1-2 months, stable  Contraception: IUD, placed 10/2018, replaced in 6 years - able to feel her strings  Risk " "for STI?: none  Last pap: 2017 - normal  2021 - normal  Repeat in 3 years  Any hx of abnormal paps:  no  FH of early CA?: none  Cholesterol/DM concerns/screenin2021 - normal   Tobacco?: no  Calcium intake: yes - multivitamin  DEXA: na  Last mammo: na  Colonoscopy: na  Immunizations: flu - 2021, covid booster 12/15/2021    Patient Active Problem List    Diagnosis Date Noted     Breast lump on left side at 5 o'clock position 10/03/2018     Priority: Medium     Asthma, exercise induced 2018     Priority: Medium     Contraception management 2014     Priority: Medium     Other acne 06/15/2011     Priority: Medium       Past Medical History:   Diagnosis Date     Acne vulgaris     No Comments Provided     Exercise-induced bronchospasm     improved with time     IUD (intrauterine device) in place 10/02/2018    Kyleena placed 10/02/18       Past Surgical History:   Procedure Laterality Date     WISDOM TOOTH EXTRACTION  2021       Family History   Problem Relation Age of Onset     Asthma Mother         Asthma     Allergy (Severe) Mother         Allergies     Hyperparathyroidism Mother      Hypertension Mother      Family History Negative Father         Good Health,Does not go to doctor regularly     Arthritis Brother      Other - See Comments Maternal Grandmother         aneurysm       Social History     Tobacco Use     Smoking status: Never Smoker     Smokeless tobacco: Never Used   Substance Use Topics     Alcohol use: Yes     Alcohol/week: 0.0 standard drinks     Comment: 3 times a month       Current Outpatient Medications   Medication Sig Dispense Refill     methylPREDNISolone (MEDROL DOSEPAK) 4 MG tablet therapy pack        penicillin V (VEETID) 500 MG tablet          No Known Allergies    REVIEW OF SYSTEMS:  Refer to HPI.    PHYSICAL EXAM:  /80   Pulse 103   Temp 98.2  F (36.8  C) (Tympanic)   Resp 18   Ht 1.651 m (5' 5\")   Wt 98.4 kg (217 lb)   SpO2 98%   " Breastfeeding No   BMI 36.11 kg/m    CONSTITUTIONAL:  Alert,cooperative, NAD.  EYES: No scleral icterus.  PERRLA.  Conjunctiva clear.  ENT/MOUTH: External ears and nose normal.  TMs normal.  Moist mucous membranes. Oropharynx clear.    ENDO: No thyromegaly or thyroidnodules.  LYMPH:  No cervical or supraclavicular LA.    BREASTS: No skin abnormalities, no erythema.  No nipple discharge, no axillary, supra- or infraclavicular LA. 1x1 cm mobile, firm, non-tender nodule on left breast at 5:00 approximately 4 cm from the nipple.   CARDIOVASCULAR: Regular,S1, S2.  No S3 or S4.  No murmur/gallop/rub.  No peripheral edema.  RESPIRATORY: CTA bilaterally, no wheezes, rhonchi or rales.  GI: Bowel sounds wnl.  Soft, nontender, nondistended.  No masses or HSM.  No rebound or guarding.  Bladder: no fullness or tenderness  MSKEL: Grossly normal ROM.  No clubbing.  INTEGUMENTARY:  Warm, dry.  No rash noted on exposed skin.  NEUROLOGIC: Facies symmetric.  Grossly normal movement and tone.  No tremor.  PSYCHIATRIC: Affect normal.  Speech fluent.      PHQ Depression Screen  PHQ-9 SCORE 5/8/2015 12/15/2021   PHQ-9 Total Score MyChart - 0   PHQ-9 Total Score 3 0       Answers for HPI/ROS submitted by the patient on 12/15/2021  If you checked off any problems, how difficult have these problems made it for you to do your work, take care of things at home, or get along with other people?: Not difficult at all  PHQ9 TOTAL SCORE: 0  LIEN 7 TOTAL SCORE: 0    Labs:  Results for orders placed or performed in visit on 12/15/21   Quantiferon-TB Gold Plus     Status: None (In process)    Specimen: Peripheral Blood    Narrative    The following orders were created for panel order Quantiferon-TB Gold Plus.  Procedure                               Abnormality         Status                     ---------                               -----------         ------                     Quantiferon TB Gold Plus...[[id number]]                      In process                  Quantiferon TB Gold Plus...[[id number]]                      In process                 Quantiferon TB Gold Plus...[[id number]]                      In process                 Quantiferon TB Gold Plus...[[id number]]                      In process                   Please view results for these tests on the individual orders.       ASSESSMENT AND PLAN:      ICD-10-CM    1. Routine history and physical examination of adult  Z00.00    2. Screening examination for pulmonary tuberculosis  Z11.1 Quantiferon-TB Gold Plus     Quantiferon-TB Gold Plus   3. Breast lump on left side at 5 o'clock position  N63.23        Ordered lab for QuantiFERON TB Gold Plus test for school. Will update with results when available.     No concerns with physical exam, able to continue on with medical school rotations without any concerns at this time.  Completed paperwork.    Breast exam: No changes to lump in left breast, which was diagnosed as a benign adenoma.  Declines repeat imaging at this time.  Encourage close monitoring.    Relevant cancer screening discussed.    Counseled on healthy diet, Calcium and vitamin D intake, and exercise.    Return in 1 year for repeat physical exam and updated lab work. Please return if any new concerns arise.     Patient Instructions   Healthy Strategies  1. Eat at least 3 meals a day and never skip breakfast.  2. Eat more slowly.  3. Decrease portion size.  4. Provide structure by using meal replacement bars or shakes, and/or low calorie frozen meals.  5. For good nutrition incorporate fruit, vegetables, whole grains, lean protein, and low-fat dairy.  6. Remove trigger foods from yourenvironment to avoid impulse eating.  7. Increase physical activity: get a pedometer and aim for 10,000 steps a day or 30-35 minutes of activity 5 days per week.  8. Weigh yourself daily or at least weekly.  9. Keep a record of what you eat and your activity.  10. Establish a support system such as [employer],  "group or program.    11. Read Nabeel Wilkerson's \"Eat to Live\". Remember it is important to have a minimum of 1200 calories a day, okay to use olive oil, 40 grams of fiber daily. No more than two servings (the size of your palm) of red meat a week.     Please consider the following general health recommendations:    Eat a quality diet (generally, low in simple sugars, starches, cholesterol and saturated fat.)    Please get 1200 mg of calcium in divided doses with 800 units vitamin D in your diet daily. Take supplements as needed to obtain full recommended amounts.     Stay physically active. Regular walking or other exercise is one of the best ways to minimize pain of arthritis; maintain independence and mobility; maintain bone strength; maintain conditioning of your heart. Find something you enjoy and a friend to do it with you.    Maintain ideal weight. Your Body mass index is Body mass index is 36.11 kg/m .. Generally a BMI of 20-25 is considered ideal. Overweight is defined as 25-30, obese is 30-35 and markedly obese is greater than 35.    Apply sun block (SPF 25 or greater) on exposed skin anytime you are out in the sun to prevent skin cancer.     Wear a seatbelt whenever you are in a car.    Obtain a flu shot every fall.    You should have a tetanus booster at least once every 10 years.    Check blood sugar annually. Cholesterol annually unless you have had a normal level when last checked within 5 years.     I recommend that you have a general physical exam every year. You should have a pap test every 3 years between the ages of 21 and 30 and every 3-5 years between the ages of 30 and 65 depending on your test unless you have had previous abnormal pap smears, (in these cases the exams and PAP's should be done on a schedule as recommended by your primary care provider). If you have had hysterectomy in the past, your future Pap plan may be different.            Jasmine Chavez PA-C ..................12/15/2021 8:39 " AM

## 2021-12-15 NOTE — PATIENT INSTRUCTIONS
"Healthy Strategies  1. Eat at least 3 meals a day and never skip breakfast.  2. Eat more slowly.  3. Decrease portion size.  4. Provide structure by using meal replacement bars or shakes, and/or low calorie frozen meals.  5. For good nutrition incorporate fruit, vegetables, whole grains, lean protein, and low-fat dairy.  6. Remove trigger foods from yourenvironment to avoid impulse eating.  7. Increase physical activity: get a pedometer and aim for 10,000 steps a day or 30-35 minutes of activity 5 days per week.  8. Weigh yourself daily or at least weekly.  9. Keep a record of what you eat and your activity.  10. Establish a support system such as afriend, group or program.    11. Read Nabeel Wilkerson's \"Eat to Live\". Remember it is important to have a minimum of 1200 calories a day, okay to use olive oil, 40 grams of fiber daily. No more than two servings (the size of your palm) of red meat a week.     Please consider the following general health recommendations:    Eat a quality diet (generally, low in simple sugars, starches, cholesterol and saturated fat.)    Please get 1200 mg of calcium in divided doses with 800 units vitamin D in your diet daily. Take supplements as needed to obtain full recommended amounts.     Stay physically active. Regular walking or other exercise is one of the best ways to minimize pain of arthritis; maintain independence and mobility; maintain bone strength; maintain conditioning of your heart. Find something you enjoy and a friend to do it with you.    Maintain ideal weight. Your Body mass index is Body mass index is 36.11 kg/m .. Generally a BMI of 20-25 is considered ideal. Overweight is defined as 25-30, obese is 30-35 and markedly obese is greater than 35.    Apply sun block (SPF 25 or greater) on exposed skin anytime you are out in the sun to prevent skin cancer.     Wear a seatbelt whenever you are in a car.    Obtain a flu shot every fall.    You should have a tetanus booster at " least once every 10 years.    Check blood sugar annually. Cholesterol annually unless you have had a normal level when last checked within 5 years.     I recommend that you have a general physical exam every year. You should have a pap test every 3 years between the ages of 21 and 30 and every 3-5 years between the ages of 30 and 65 depending on your test unless you have had previous abnormal pap smears, (in these cases the exams and PAP's should be done on a schedule as recommended by your primary care provider). If you have had hysterectomy in the past, your future Pap plan may be different.

## 2021-12-15 NOTE — NURSING NOTE
"Chief Complaint   Patient presents with     Physical   Patient denies any concerns or issues.     Initial /80   Pulse 103   Temp 98.2  F (36.8  C) (Tympanic)   Resp 18   Ht 1.651 m (5' 5\")   Wt 98.4 kg (217 lb)   SpO2 98%   Breastfeeding No   BMI 36.11 kg/m   Estimated body mass index is 36.11 kg/m  as calculated from the following:    Height as of this encounter: 1.651 m (5' 5\").    Weight as of this encounter: 98.4 kg (217 lb).  Medication Reconciliation: complete    FOOD SECURITY SCREENING QUESTIONS  Hunger Vital Signs:  Within the past 12 months we worried whether our food would run out before we got money to buy more. Never  Within the past 12 months the food we bought just didn't last and we didn't have money to get more. Never    Advanced Care Directive Reviewed    Jimy Dewey LPN    "

## 2021-12-16 ASSESSMENT — ANXIETY QUESTIONNAIRES: GAD7 TOTAL SCORE: 0

## 2021-12-16 ASSESSMENT — PATIENT HEALTH QUESTIONNAIRE - PHQ9: SUM OF ALL RESPONSES TO PHQ QUESTIONS 1-9: 0

## 2021-12-17 LAB
GAMMA INTERFERON BACKGROUND BLD IA-ACNC: 0 IU/ML
M TB IFN-G BLD-IMP: NEGATIVE
M TB IFN-G CD4+ BCKGRND COR BLD-ACNC: 1.77 IU/ML
MITOGEN IGNF BCKGRD COR BLD-ACNC: 0 IU/ML
MITOGEN IGNF BCKGRD COR BLD-ACNC: 0.03 IU/ML
QUANTIFERON MITOGEN: 1.77 IU/ML
QUANTIFERON NIL TUBE: 0 IU/ML
QUANTIFERON TB1 TUBE: 0.03 IU/ML
QUANTIFERON TB2 TUBE: 0

## 2022-09-17 ENCOUNTER — HEALTH MAINTENANCE LETTER (OUTPATIENT)
Age: 27
End: 2022-09-17

## 2022-09-20 ENCOUNTER — TELEPHONE (OUTPATIENT)
Dept: FAMILY MEDICINE | Facility: OTHER | Age: 27
End: 2022-09-20

## 2022-09-20 NOTE — TELEPHONE ENCOUNTER
Patient Quality Outreach    Patient is due for the following:   Asthma  -  ACT needed    Next Steps:   Please call    Type of outreach:    Sent [employer] message.      Questions for provider review:    None     Jasmine Chavez PA-C

## 2022-09-22 ENCOUNTER — MYC MEDICAL ADVICE (OUTPATIENT)
Dept: FAMILY MEDICINE | Facility: OTHER | Age: 27
End: 2022-09-22

## 2022-09-22 ASSESSMENT — ASTHMA QUESTIONNAIRES
QUESTION_1 LAST FOUR WEEKS HOW MUCH OF THE TIME DID YOUR ASTHMA KEEP YOU FROM GETTING AS MUCH DONE AT WORK, SCHOOL OR AT HOME: NONE OF THE TIME
QUESTION_5 LAST FOUR WEEKS HOW WOULD YOU RATE YOUR ASTHMA CONTROL: COMPLETELY CONTROLLED
QUESTION_2 LAST FOUR WEEKS HOW OFTEN HAVE YOU HAD SHORTNESS OF BREATH: NOT AT ALL
ACT_TOTALSCORE: 25
QUESTION_3 LAST FOUR WEEKS HOW OFTEN DID YOUR ASTHMA SYMPTOMS (WHEEZING, COUGHING, SHORTNESS OF BREATH, CHEST TIGHTNESS OR PAIN) WAKE YOU UP AT NIGHT OR EARLIER THAN USUAL IN THE MORNING: NOT AT ALL
ACT_TOTALSCORE: 25
QUESTION_4 LAST FOUR WEEKS HOW OFTEN HAVE YOU USED YOUR RESCUE INHALER OR NEBULIZER MEDICATION (SUCH AS ALBUTEROL): NOT AT ALL

## 2022-09-30 ASSESSMENT — ASTHMA QUESTIONNAIRES: ACT_TOTALSCORE: 25

## 2023-01-28 ENCOUNTER — HEALTH MAINTENANCE LETTER (OUTPATIENT)
Age: 28
End: 2023-01-28

## 2024-02-25 ENCOUNTER — HEALTH MAINTENANCE LETTER (OUTPATIENT)
Age: 29
End: 2024-02-25

## (undated) RX ORDER — LIDOCAINE HYDROCHLORIDE 10 MG/ML
INJECTION, SOLUTION INFILTRATION; PERINEURAL
Status: DISPENSED
Start: 2018-09-26

## (undated) RX ORDER — LIDOCAINE HYDROCHLORIDE AND EPINEPHRINE 10; 10 MG/ML; UG/ML
INJECTION, SOLUTION INFILTRATION; PERINEURAL
Status: DISPENSED
Start: 2018-09-26